# Patient Record
Sex: FEMALE | Race: WHITE | NOT HISPANIC OR LATINO | Employment: FULL TIME | URBAN - METROPOLITAN AREA
[De-identification: names, ages, dates, MRNs, and addresses within clinical notes are randomized per-mention and may not be internally consistent; named-entity substitution may affect disease eponyms.]

---

## 2017-03-08 ENCOUNTER — OFFICE VISIT (OUTPATIENT)
Dept: URGENT CARE | Facility: CLINIC | Age: 57
End: 2017-03-08
Payer: COMMERCIAL

## 2017-03-08 VITALS
RESPIRATION RATE: 20 BRPM | HEART RATE: 88 BPM | OXYGEN SATURATION: 96 % | SYSTOLIC BLOOD PRESSURE: 128 MMHG | DIASTOLIC BLOOD PRESSURE: 80 MMHG | TEMPERATURE: 98.8 F

## 2017-03-08 DIAGNOSIS — I10 ESSENTIAL HYPERTENSION: ICD-10-CM

## 2017-03-08 DIAGNOSIS — K57.52 DIVERTICULITIS OF BOTH SMALL AND LARGE INTESTINE WITHOUT PERFORATION OR ABSCESS WITHOUT BLEEDING: Primary | ICD-10-CM

## 2017-03-08 PROCEDURE — 99204 OFFICE O/P NEW MOD 45 MIN: CPT | Performed by: PHYSICIAN ASSISTANT

## 2017-03-08 RX ORDER — METRONIDAZOLE 500 MG/1
500 TABLET ORAL EVERY 8 HOURS
Qty: 30 TAB | Refills: 0 | Status: SHIPPED | OUTPATIENT
Start: 2017-03-08 | End: 2017-03-18

## 2017-03-08 RX ORDER — METOPROLOL TARTRATE AND HYDROCHLOROTHIAZIDE 50; 25 MG/1; MG/1
1 TABLET ORAL DAILY
COMMUNITY
End: 2017-03-08

## 2017-03-08 RX ORDER — CIPROFLOXACIN 500 MG/1
500 TABLET, FILM COATED ORAL 2 TIMES DAILY
Qty: 10 TAB | Refills: 0 | Status: SHIPPED | OUTPATIENT
Start: 2017-03-08 | End: 2017-03-13

## 2017-03-08 RX ORDER — METOPROLOL TARTRATE AND HYDROCHLOROTHIAZIDE 50; 25 MG/1; MG/1
1 TABLET ORAL 2 TIMES DAILY
Qty: 60 TAB | Refills: 3 | Status: SHIPPED | OUTPATIENT
Start: 2017-03-08 | End: 2017-04-07

## 2017-03-08 ASSESSMENT — CROHNS DISEASE ACTIVITY INDEX (CDAI): CDAI SCORE: 0

## 2017-03-08 ASSESSMENT — ENCOUNTER SYMPTOMS: ABDOMINAL PAIN: 1

## 2017-03-08 NOTE — PATIENT INSTRUCTIONS
Diverticulitis  Diverticulitis is inflammation or infection of small pouches in your colon that form when you have a condition called diverticulosis. The pouches in your colon are called diverticula. Your colon, or large intestine, is where water is absorbed and stool is formed.  Complications of diverticulitis can include:  · Bleeding.  · Severe infection.  · Severe pain.  · Perforation of your colon.  · Obstruction of your colon.  CAUSES   Diverticulitis is caused by bacteria.  Diverticulitis happens when stool becomes trapped in diverticula. This allows bacteria to grow in the diverticula, which can lead to inflammation and infection.  RISK FACTORS  People with diverticulosis are at risk for diverticulitis. Eating a diet that does not include enough fiber from fruits and vegetables may make diverticulitis more likely to develop.  SYMPTOMS   Symptoms of diverticulitis may include:  · Abdominal pain and tenderness. The pain is normally located on the left side of the abdomen, but may occur in other areas.  · Fever and chills.  · Bloating.  · Cramping.  · Nausea.  · Vomiting.  · Constipation.  · Diarrhea.  · Blood in your stool.  DIAGNOSIS   Your health care provider will ask you about your medical history and do a physical exam. You may need to have tests done because many medical conditions can cause the same symptoms as diverticulitis. Tests may include:  · Blood tests.  · Urine tests.  · Imaging tests of the abdomen, including X-rays and CT scans.  When your condition is under control, your health care provider may recommend that you have a colonoscopy. A colonoscopy can show how severe your diverticula are and whether something else is causing your symptoms.  TREATMENT   Most cases of diverticulitis are mild and can be treated at home. Treatment may include:  · Taking over-the-counter pain medicines.  · Following a clear liquid diet.  · Taking antibiotic medicines by mouth for 7-10 days.  More severe cases may  be treated at a hospital. Treatment may include:  · Not eating or drinking.  · Taking prescription pain medicine.  · Receiving antibiotic medicines through an IV tube.  · Receiving fluids and nutrition through an IV tube.  · Surgery.  HOME CARE INSTRUCTIONS   · Follow your health care provider's instructions carefully.  · Follow a full liquid diet or other diet as directed by your health care provider. After your symptoms improve, your health care provider may tell you to change your diet. He or she may recommend you eat a high-fiber diet. Fruits and vegetables are good sources of fiber. Fiber makes it easier to pass stool.  · Take fiber supplements or probiotics as directed by your health care provider.  · Only take medicines as directed by your health care provider.  · Keep all your follow-up appointments.  SEEK MEDICAL CARE IF:   · Your pain does not improve.  · You have a hard time eating food.  · Your bowel movements do not return to normal.  SEEK IMMEDIATE MEDICAL CARE IF:   · Your pain becomes worse.  · Your symptoms do not get better.  · Your symptoms suddenly get worse.  · You have a fever.  · You have repeated vomiting.  · You have bloody or black, tarry stools.  MAKE SURE YOU:   · Understand these instructions.  · Will watch your condition.  · Will get help right away if you are not doing well or get worse.     This information is not intended to replace advice given to you by your health care provider. Make sure you discuss any questions you have with your health care provider.     Document Released: 09/27/2006 Document Revised: 12/23/2014 Document Reviewed: 11/12/2014  [employer] Interactive Patient Education ©2016 [employer]

## 2017-03-08 NOTE — Clinical Note
March 8, 2017       Patient: Charlene Min   YOB: 1960   Date of Visit: 3/8/2017         To Whom It May Concern:    It is my medical opinion that Charlene Min may be excused from work for the date of 3/7/17 due to an acute on chronic flare up of diverticulitis.      If you have any questions or concerns, please don't hesitate to call [phone]          Sincerely,          Ousmane Garcia PA-C  Electronically Signed

## 2017-03-08 NOTE — MR AVS SNAPSHOT
Charlene Min   3/8/2017 8:30 AM   Office Visit   MRN: [id number]    Department:  [facility]   Dept Phone:  [phone]    Description:  Female : 1960   Provider:  Ousmane Garcia PA-C           Reason for Visit     Abdominal Pain Couple days LLQ pain.      Allergies as of 3/8/2017     Allergen Noted Reactions    Pcn [Penicillins] 12/15/2016         You were diagnosed with     Diverticulitis of both small and large intestine without perforation or abscess without bleeding   [[id number]]  -  Primary     Essential hypertension   [[id number]]         Vital Signs     Blood Pressure Pulse Temperature Respirations Oxygen Saturation Smoking Status    128/80 mmHg 88 37.1 °C (98.8 °F) 20 96% Current Some Day Smoker      Basic Information     Date Of Birth Sex Race Ethnicity Preferred Language    1960 Female White Non- English      Health Maintenance        Date Due Completion Dates    IMM DTaP/Tdap/Td Vaccine (1 - Tdap) 1981 ---    PAP SMEAR 1983 ---    MAMMOGRAM 2002 ---    COLONOSCOPY 2012 ---            Current Immunizations     Influenza Vaccine Quad Inj (Pf) 2016      Below and/or attached are the medications your provider expects you to take. Review all of your home medications and newly ordered medications with your provider and/or pharmacist. Follow medication instructions as directed by your provider and/or pharmacist. Please keep your medication list with you and share with your provider. Update the information when medications are discontinued, doses are changed, or new medications (including over-the-counter products) are added; and carry medication information at all times in the event of emergency situations     Allergies:  PCN - (reactions not documented)               Medications  Valid as of: 2017 -  9:06 AM    Generic Name Brand Name Tablet Size Instructions for use    Ciprofloxacin HCl (Tab) CIPRO 500 MG Take 1 Tab by mouth 2 times a day for 5  days.        Hydrocodone-Acetaminophen (Tab) NORCO 5-325 MG Take 1-2 Tabs by mouth every 6 hours as needed.        Metoprolol-Hydrochlorothiazide (Tab) LOPRESSOR HCT 50-25 MG Take 1 Tab by mouth 2 times a day for 30 days.        MetroNIDAZOLE (Tab) FLAGYL 500 MG Take 1 Tab by mouth every 8 hours for 10 days.        .                 Medicines prescribed today were sent to:     [id number] - [place], [place] - [address]    [address] [place] [address]    Phone: [phone] Fax: [phone]    Open 24 Hours?: No      Medication refill instructions:       If your prescription bottle indicates you have medication refills left, it is not necessary to call your provider’s office. Please contact your pharmacy and they will refill your medication.    If your prescription bottle indicates you do not have any refills left, you may request refills at any time through one of the following ways: The online [employer] system (except Urgent Care), by calling your provider’s office, or by asking your pharmacy to contact your provider’s office with a refill request. Medication refills are processed only during regular business hours and may not be available until the next business day. Your provider may request additional information or to have a follow-up visit with you prior to refilling your medication.   *Please Note: Medication refills are assigned a new Rx number when refilled electronically. Your pharmacy may indicate that no refills were authorized even though a new prescription for the same medication is available at the pharmacy. Please request the medicine by name with the pharmacy before contacting your provider for a refill.        Instructions    Diverticulitis  Diverticulitis is inflammation or infection of small pouches in your colon that form when you have a condition called diverticulosis. The pouches in your colon are called diverticula. Your colon, or large intestine, is where water is absorbed and  stool is formed.  Complications of diverticulitis can include:  · Bleeding.  · Severe infection.  · Severe pain.  · Perforation of your colon.  · Obstruction of your colon.  CAUSES   Diverticulitis is caused by bacteria.  Diverticulitis happens when stool becomes trapped in diverticula. This allows bacteria to grow in the diverticula, which can lead to inflammation and infection.  RISK FACTORS  People with diverticulosis are at risk for diverticulitis. Eating a diet that does not include enough fiber from fruits and vegetables may make diverticulitis more likely to develop.  SYMPTOMS   Symptoms of diverticulitis may include:  · Abdominal pain and tenderness. The pain is normally located on the left side of the abdomen, but may occur in other areas.  · Fever and chills.  · Bloating.  · Cramping.  · Nausea.  · Vomiting.  · Constipation.  · Diarrhea.  · Blood in your stool.  DIAGNOSIS   Your health care provider will ask you about your medical history and do a physical exam. You may need to have tests done because many medical conditions can cause the same symptoms as diverticulitis. Tests may include:  · Blood tests.  · Urine tests.  · Imaging tests of the abdomen, including X-rays and CT scans.  When your condition is under control, your health care provider may recommend that you have a colonoscopy. A colonoscopy can show how severe your diverticula are and whether something else is causing your symptoms.  TREATMENT   Most cases of diverticulitis are mild and can be treated at home. Treatment may include:  · Taking over-the-counter pain medicines.  · Following a clear liquid diet.  · Taking antibiotic medicines by mouth for 7-10 days.  More severe cases may be treated at a hospital. Treatment may include:  · Not eating or drinking.  · Taking prescription pain medicine.  · Receiving antibiotic medicines through an IV tube.  · Receiving fluids and nutrition through an IV tube.  · Surgery.  HOME CARE INSTRUCTIONS    · Follow your health care provider's instructions carefully.  · Follow a full liquid diet or other diet as directed by your health care provider. After your symptoms improve, your health care provider may tell you to change your diet. He or she may recommend you eat a high-fiber diet. Fruits and vegetables are good sources of fiber. Fiber makes it easier to pass stool.  · Take fiber supplements or probiotics as directed by your health care provider.  · Only take medicines as directed by your health care provider.  · Keep all your follow-up appointments.  SEEK MEDICAL CARE IF:   · Your pain does not improve.  · You have a hard time eating food.  · Your bowel movements do not return to normal.  SEEK IMMEDIATE MEDICAL CARE IF:   · Your pain becomes worse.  · Your symptoms do not get better.  · Your symptoms suddenly get worse.  · You have a fever.  · You have repeated vomiting.  · You have bloody or black, tarry stools.  MAKE SURE YOU:   · Understand these instructions.  · Will watch your condition.  · Will get help right away if you are not doing well or get worse.     This information is not intended to replace advice given to you by your health care provider. Make sure you discuss any questions you have with your health care provider.     Document Released: 09/27/2006 Document Revised: 12/23/2014 Document Reviewed: 11/12/2014  [employer] Interactive Patient Education ©2016 [employer].            5 Screens [employer] Access Code: Activation code not generated  Current TurnTide Status: Active          Quit Tobacco Information     Do you want to quit using tobacco?    Quitting tobacco decreases risks of cancer, heart and lung disease, increases life expectancy, improves sense of taste and smell, and increases spending money, among other benefits.    If you are thinking about quitting, we can help.  • [facility] Quit Tobacco Program: 373-444-1883  o Program occurs weekly for four weeks and includes pharmacist consultation on products  to support quitting smoking or chewing tobacco. A provider referral is needed for pharmacist consultation.  • Tobacco Users Help Hotline: 5-759-QUIT-NOW (327-5123) or https://[place].quitlogix.org/  o Free, confidential telephone and online coaching for [place] residents. Sessions are designed on a schedule that is convenient for you. Eligible clients receive free nicotine replacement therapy.  • Nationally: www.smokefree.gov  o Information and professional assistance to support both immediate and long-term needs as you become, and remain, a non-smoker. Smokefree.gov allows you to choose the help that best fits your needs.

## 2017-03-08 NOTE — PROGRESS NOTES
Subjective:      Pt is a 56 y.o. female who presents with Abdominal Pain            Abdominal Pain  This is a new problem. The current episode started yesterday. The onset quality is sudden. The problem occurs constantly. The problem has been gradually worsening. The pain is located in the LLQ. The pain is at a severity of 6/10. The pain is moderate. The quality of the pain is aching. The abdominal pain does not radiate. The pain is aggravated by eating and palpation. The pain is relieved by nothing. She has tried nothing for the symptoms. There is no history of abdominal surgery, colon cancer, Crohn's disease, gallstones, GERD, irritable bowel syndrome, pancreatitis or PUD. diverticulitis   Pt notes she was diagnosed with diverticulitis on 12/12/16. She states she has been eating without restrictions lately and notes that maybe the sunflower seeds in the salads at work might have triggered this current episode for the last 2 days with LLQ abd pain which she notes is in the same vein as the previous flare up. Pt has not taken any Rx medications for this condition. Pt states the pain is a 7/10, aching in nature and worse at night. Pt denies CP, SOB, NVD, paresthesias, headaches, dizziness, change in vision, hives, or other joint pain. The pt's medication list, problem list, and allergies have been evaluated and reviewed during today's visit. PT notes she has HTN and is running out of her Lopressor.    PMH:  Negative per pt.      PSH:  Negative per pt.      Fam Hx:  Father with hx of HTN      Soc HX:  Social History     Social History   • Marital Status: Single     Spouse Name: N/A   • Number of Children: N/A   • Years of Education: N/A     Occupational History   • Not on file.     Social History Main Topics   • Smoking status: Current Some Day Smoker     Types: Cigarettes   • Smokeless tobacco: Not on file   • Alcohol Use: Not on file   • Drug Use: Not on file   • Sexual Activity: Not on file     Other Topics Concern    • Not on file     Social History Narrative   • No narrative on file         Medications:    Current outpatient prescriptions:   •  ciprofloxacin (CIPRO) 500 MG Tab, Take 1 Tab by mouth 2 times a day for 5 days., Disp: 10 Tab, Rfl: 0  •  metronidazole (FLAGYL) 500 MG Tab, Take 1 Tab by mouth every 8 hours for 10 days., Disp: 30 Tab, Rfl: 0  •  metoprolol-hydrochlorothiazide (LOPRESSOR HCT) 50-25 MG per tablet, Take 1 Tab by mouth 2 times a day for 30 days., Disp: 60 Tab, Rfl: 3  •  hydrocodone-acetaminophen (NORCO) 5-325 MG Tab per tablet, Take 1-2 Tabs by mouth every 6 hours as needed., Disp: 20 Tab, Rfl: 0      Allergies:  Pcn        Review of Systems   Gastrointestinal: Positive for abdominal pain.   Constitutional: Negative for fever, chills and malaise/fatigue.   HENT: Negative for congestion and sore throat.    Eyes: Negative for blurred vision, double vision and photophobia.   Respiratory: Negative for cough and shortness of breath.    Cardiovascular: Negative for chest pain and palpitations.   Gastrointestinal: POS  LLQ abdominal pain   Genitourinary: Negative for dysuria and flank pain.   Musculoskeletal: Negative for joint pain and myalgias.   Skin: Negative for itching and rash.   Neurological: Negative for dizziness, tingling and headaches.   Endo/Heme/Allergies: Does not bruise/bleed easily.   Psychiatric/Behavioral: Negative for depression. The patient is not nervous/anxious.             Objective:     /80 mmHg  Pulse 88  Temp(Src) 37.1 °C (98.8 °F)  Resp 20  SpO2 96%     Physical Exam   Abdominal: Soft. Bowel sounds are normal. She exhibits no shifting dullness, no distension, no pulsatile liver, no fluid wave, no abdominal bruit, no ascites, no pulsatile midline mass and no mass. There is no hepatosplenomegaly. There is tenderness in the left lower quadrant. There is no rigidity, no rebound, no guarding, no CVA tenderness, no tenderness at McBurney's point and negative Paul's sign. No  hernia.              Constitutional: PT is oriented to person, place, and time. PT appears well-developed and well-nourished. No distress.   HENT:   Head: Normocephalic and atraumatic.   Mouth/Throat: Oropharynx is clear and moist. No oropharyngeal exudate.   Eyes: Conjunctivae normal and EOM are normal. Pupils are equal, round, and reactive to light.   Neck: Normal range of motion. Neck supple. No thyromegaly present.   Cardiovascular: Normal rate, regular rhythm, normal heart sounds and intact distal pulses.  Exam reveals no gallop and no friction rub.    No murmur heard.  Pulmonary/Chest: Effort normal and breath sounds normal. No respiratory distress. PT has no wheezes. PT has no rales. Pt exhibits no tenderness.   Musculoskeletal: Normal range of motion. PT exhibits no edema and no tenderness.   Neurological: PT is alert and oriented to person, place, and time. PT has normal reflexes. No cranial nerve deficit.   Skin: Skin is warm and dry. No rash noted. PT is not diaphoretic. No erythema.       Psychiatric: PT has a normal mood and affect. PT behavior is normal. Judgment and thought content normal.        Assessment/Plan:     1. Diverticulitis of both small and large intestine without perforation or abscess without bleeding    - ciprofloxacin (CIPRO) 500 MG Tab; Take 1 Tab by mouth 2 times a day for 5 days.  Dispense: 10 Tab; Refill: 0  - metronidazole (FLAGYL) 500 MG Tab; Take 1 Tab by mouth every 8 hours for 10 days.  Dispense: 30 Tab; Refill: 0    2. Essential hypertension    - metoprolol-hydrochlorothiazide (LOPRESSOR HCT) 50-25 MG per tablet; Take 1 Tab by mouth 2 times a day for 30 days.  Dispense: 60 Tab; Refill: 3    Rest, fluids encouraged.  Note given for work.  AVS with medical info given.  Pt was in full understanding and agreement with the plan.  Follow-up as needed if symptoms worsen or fail to improve.

## 2017-03-23 ENCOUNTER — OFFICE VISIT (OUTPATIENT)
Dept: MEDICAL GROUP | Facility: MEDICAL CENTER | Age: 57
End: 2017-03-23
Payer: COMMERCIAL

## 2017-03-23 VITALS
WEIGHT: 231 LBS | DIASTOLIC BLOOD PRESSURE: 64 MMHG | SYSTOLIC BLOOD PRESSURE: 118 MMHG | TEMPERATURE: 97.8 F | RESPIRATION RATE: 16 BRPM | HEIGHT: 65 IN | OXYGEN SATURATION: 95 % | BODY MASS INDEX: 38.49 KG/M2 | HEART RATE: 74 BPM

## 2017-03-23 DIAGNOSIS — Z12.31 ENCOUNTER FOR SCREENING MAMMOGRAM FOR BREAST CANCER: ICD-10-CM

## 2017-03-23 DIAGNOSIS — Z72.0 TOBACCO USE: ICD-10-CM

## 2017-03-23 DIAGNOSIS — Z82.49 FAMILY HISTORY OF EARLY CAD: ICD-10-CM

## 2017-03-23 DIAGNOSIS — E66.9 OBESITY (BMI 30-39.9): ICD-10-CM

## 2017-03-23 DIAGNOSIS — I10 ESSENTIAL HYPERTENSION: ICD-10-CM

## 2017-03-23 DIAGNOSIS — Z12.11 ENCOUNTER FOR FIT (FECAL IMMUNOCHEMICAL TEST) SCREENING: ICD-10-CM

## 2017-03-23 DIAGNOSIS — K57.32 DIVERTICULITIS OF LARGE INTESTINE WITHOUT PERFORATION OR ABSCESS WITHOUT BLEEDING: ICD-10-CM

## 2017-03-23 DIAGNOSIS — Z13.220 LIPID SCREENING: ICD-10-CM

## 2017-03-23 DIAGNOSIS — Z80.0 FAMILY HISTORY OF RECTAL CANCER: ICD-10-CM

## 2017-03-23 PROCEDURE — 99204 OFFICE O/P NEW MOD 45 MIN: CPT | Performed by: NURSE PRACTITIONER

## 2017-03-23 ASSESSMENT — PATIENT HEALTH QUESTIONNAIRE - PHQ9: CLINICAL INTERPRETATION OF PHQ2 SCORE: 0

## 2017-03-23 NOTE — ASSESSMENT & PLAN NOTE
Brother passed from rectal cancer in his 40s  She did have colonoscopy many years ago which was normal, is due for repeat at this time and declines. She is agreeable to completing fit test

## 2017-03-23 NOTE — ASSESSMENT & PLAN NOTE
Smoking on and off for about 30 years. Planning on quitting although she has not yet set a quit date

## 2017-03-23 NOTE — ASSESSMENT & PLAN NOTE
"Mother passed from MI at age 52  States her cholesterol has been \"normal\" in the past although has not been checked recently  "

## 2017-03-23 NOTE — ASSESSMENT & PLAN NOTE
2 episodes in the last 3 months, most recently 3/8/17. Missed 4 days of work the first time, 2 days of work the more recent episode  Now improved after abx therapy  Needs paperwork completed to avoid work penalty , has not been employed yet for a year and therefore does not qualify for FMLA leave  No current abdominal pain, bloating, nausea. She is having some loose stool but this seems to be gradually improving

## 2017-03-23 NOTE — ASSESSMENT & PLAN NOTE
Chronic issue managed with metoprolol-hctz 50-25 mg daily  Blood pressure controlled in the office today  No dizziness, palpitation, chest pain  No history of chronic kidney disease

## 2017-03-23 NOTE — PROGRESS NOTES
"Chief Complaint   Patient presents with   • Establish Care     Charlene Min is a 56 y.o. female here to [facility], she is not recently seen a primary care provider. We discussed:    Diverticulitis of large intestine without perforation or abscess without bleeding  2 episodes in the last 3 months, most recently 3/8/17. Missed 4 days of work the first time, 2 days of work the more recent episode  Now improved after abx therapy  Needs paperwork completed to avoid work penalty , has not been employed yet for a year and therefore does not qualify for FMLA leave  No current abdominal pain, bloating, nausea. She is having some loose stool but this seems to be gradually improving    Essential hypertension  Chronic issue managed with metoprolol-hctz 50-25 mg daily  Blood pressure controlled in the office today  No dizziness, palpitation, chest pain  No history of chronic kidney disease    Family history of early CAD  Mother passed from MI at age 52  States her cholesterol has been \"normal\" in the past although has not been checked recently    Tobacco use  Smoking on and off for about 30 years. Planning on quitting although she has not yet set a quit date    Family history of rectal cancer  Brother passed from rectal cancer in his 40s  She did have colonoscopy many years ago which was normal, is due for repeat at this time and declines. She is agreeable to completing fit test    BMI 38  Not exercising, admits her diet needs improvement. States that she's not been taking care of herself recently    Due for mammogram    Current medicines (including changes today)  Current Outpatient Prescriptions   Medication Sig Dispense Refill   • metoprolol-hydrochlorothiazide (LOPRESSOR HCT) 50-25 MG per tablet Take 1 Tab by mouth 2 times a day for 30 days. 60 Tab 3   • hydrocodone-acetaminophen (NORCO) 5-325 MG Tab per tablet Take 1-2 Tabs by mouth every 6 hours as needed. 20 Tab 0     No current facility-administered medications for " "this visit.     She  has a past medical history of Hypertension.  She  has past surgical history that includes abdominal hysterectomy total.  Social History   Substance Use Topics   • Smoking status: Current Some Day Smoker     Types: Cigarettes   • Smokeless tobacco: Never Used   • Alcohol Use: No     Social History     Social History Narrative     Family History   Problem Relation Age of Onset   • Heart Disease Mother    • Hypertension Mother    • Lung Disease Father    • Cancer Brother      colorectal     Family Status   Relation Status Death Age   • Mother     • Father     • Brother Alive    • Brother Alive    • Brother Alive    • Brother           ROS  Problems listed discussed above, all other systems reviewed and negative     Objective:     Blood pressure 118/64, pulse 74, temperature 36.6 °C (97.8 °F), resp. rate 16, height 1.651 m (5' 5\"), weight 104.781 kg (231 lb), SpO2 95 %. Body mass index is 38.44 kg/(m^2).  Physical Exam:  General: Alert, oriented in no acute distress.  Eye contact is good, speech is normal, affect calm  HEENT: perrl, Oral mucosa pink moist, no lesions. Nares patent. TMs gray with good landmarks bilaterally. No cervical or supraclavicular lymphadenopathy, no thyromegaly  Lungs: clear to auscultation bilaterally, good aeration, normal effort. No wheeze/ rhonchi/ rales.  CV: regular rate and rhythm, S1, S2. No murmur, no JVD, no edema. Pedal pulses 2 + bilaterally  Abdomen: soft, nontender, BS x4, central obesity  Ext: color normal, vascularity normal, temperature normal. No rash or lesions.  MS: no point tenderness over spine, no obvious deformity. No joint swelling or redness. Strength is 5/5 globally  Neuro: DTR 2+ bilaterally   Assessment and Plan:   The following treatment plan was discussed   1. Diverticulitis of large intestine without perforation or abscess without bleeding   2 flares in the last 3 months requiring workup since. She is not yet been " employed for one year and now needing reasonable accommodation forms completed. I will complete paperwork, she may pick it up at the  tomorrow    2. Essential hypertension   stable  COMP METABOLIC PANEL   3. Tobacco use   cessation encouraged, she does plan to quit but is not yet set a quit date. Options for smoking cessation assistance reviewed briefly including nicotine replacement and medications like Chantix. She'll follow-up if needing assistance with this    4. Encounter for FIT (fecal immunochemical test) screening  OCCULT BLOOD FECES IMMUNOASSAY   5. Encounter for screening mammogram for breast cancer  MA-SCREEN MAMMO W/CAD-BILAT   6. Lipid screening  LIPID PROFILE   7. Family history of early CAD   encourage smoking cessation, exercise and weight loss   8. Family history of rectal cancer   Brother passed from rectal cancer in his 40s. She has had colonoscopy in the past which was reportedly normal, declines referral at this time although she would be due for routine screening. Encouraged her to reconsider, notify me if needing referral. Fit test as listed above    9. Obesity (BMI 30-39.9)  Patient identified as having weight management issue.  Appropriate orders and counseling given.       Educated in proper administration of medication(s) ordered today including safety, possible SE, risks, benefits, rationale and alternatives to therapy.   Followup: pending labs             Please note that this dictation was created using voice recognition software. I have worked with consultants from the vendor as well as technical experts from [employer] to optimize the interface. I have made every reasonable attempt to correct obvious errors, but I expect that there are errors of grammar and possibly content that I did not discover before finalizing the note.

## 2017-03-23 NOTE — MR AVS SNAPSHOT
"        Charlene Min   3/23/2017 10:00 AM   Office Visit   MRN: [id number]    Department:  [facility]   Dept Phone:  [phone]    Description:  Female : 1960   Provider:  MARIA T Virgen           Reason for Visit     Establish Care           Allergies as of 3/23/2017     Allergen Noted Reactions    Pcn [Penicillins] 12/15/2016         You were diagnosed with     Diverticulitis of large intestine without perforation or abscess without bleeding   [785408]       Essential hypertension   [7169967]       Tobacco use   [725406]       Encounter for FIT (fecal immunochemical test) screening   [1624724]       Encounter for screening mammogram for breast cancer   [9824700]       Lipid screening   [770648]       Family history of early CAD   [703724]         Vital Signs     Blood Pressure Pulse Temperature Respirations Height Weight    118/64 mmHg 74 36.6 °C (97.8 °F) 16 1.651 m (5' 5\") 104.781 kg (231 lb)    Body Mass Index Oxygen Saturation Smoking Status             38.44 kg/m2 95% Current Some Day Smoker         Basic Information     Date Of Birth Sex Race Ethnicity Preferred Language    1960 Female White Non- English      Problem List              ICD-10-CM Priority Class Noted - Resolved    Diverticulitis of large intestine without perforation or abscess without bleeding K57.32   3/23/2017 - Present    Essential hypertension I10   3/23/2017 - Present    Tobacco use Z72.0   3/23/2017 - Present    Family history of early CAD Z82.49   3/23/2017 - Present      Health Maintenance        Date Due Completion Dates    IMM DTaP/Tdap/Td Vaccine (1 - Tdap) 1981 ---    PAP SMEAR 1983 ---    MAMMOGRAM 2002 ---    COLONOSCOPY 2012 ---            Current Immunizations     Influenza Vaccine Quad Inj (Pf) 2016      Below and/or attached are the medications your provider expects you to take. Review all of your home medications and newly ordered medications with your " provider and/or pharmacist. Follow medication instructions as directed by your provider and/or pharmacist. Please keep your medication list with you and share with your provider. Update the information when medications are discontinued, doses are changed, or new medications (including over-the-counter products) are added; and carry medication information at all times in the event of emergency situations     Allergies:  PCN - (reactions not documented)               Medications  Valid as of: March 23, 2017 - 10:23 AM    Generic Name Brand Name Tablet Size Instructions for use    Hydrocodone-Acetaminophen (Tab) NORCO 5-325 MG Take 1-2 Tabs by mouth every 6 hours as needed.        Metoprolol-Hydrochlorothiazide (Tab) LOPRESSOR HCT 50-25 MG Take 1 Tab by mouth 2 times a day for 30 days.        .                 Medicines prescribed today were sent to:     [facility][id number] - [place], [place] - [address]    [address] [address]    Phone: [phone] Fax: [phone]    Open 24 Hours?: No      Medication refill instructions:       If your prescription bottle indicates you have medication refills left, it is not necessary to call your provider’s office. Please contact your pharmacy and they will refill your medication.    If your prescription bottle indicates you do not have any refills left, you may request refills at any time through one of the following ways: The online [employer] system (except Urgent Care), by calling your provider’s office, or by asking your pharmacy to contact your provider’s office with a refill request. Medication refills are processed only during regular business hours and may not be available until the next business day. Your provider may request additional information or to have a follow-up visit with you prior to refilling your medication.   *Please Note: Medication refills are assigned a new Rx number when refilled electronically. Your pharmacy may indicate that no refills were  authorized even though a new prescription for the same medication is available at the pharmacy. Please request the medicine by name with the pharmacy before contacting your provider for a refill.        Your To Do List     Future Labs/Procedures Complete By Expires    COMP METABOLIC PANEL  As directed 3/24/2018    LIPID PROFILE  As directed 3/24/2018    MA-SCREEN MAMMO W/CAD-BILAT  As directed 4/24/2018    OCCULT BLOOD FECES IMMUNOASSAY  As directed 3/24/2018         MyChart Access Code: Activation code not generated  Current MyChart Status: Active          Quit Tobacco Information     Do you want to quit using tobacco?    Quitting tobacco decreases risks of cancer, heart and lung disease, increases life expectancy, improves sense of taste and smell, and increases spending money, among other benefits.    If you are thinking about quitting, we can help.  • [employer] Quit Tobacco Program: 802.619.6671  o Program occurs weekly for four weeks and includes pharmacist consultation on products to support quitting smoking or chewing tobacco. A provider referral is needed for pharmacist consultation.  • Tobacco Users Help Hotline: 3-068-QUITNOW (925-3790) or https://[place].quitlogix.org/  o Free, confidential telephone and online coaching for [place] residents. Sessions are designed on a schedule that is convenient for you. Eligible clients receive free nicotine replacement therapy.  • Nationally: www.smokefree.gov  o Information and professional assistance to support both immediate and long-term needs as you become, and remain, a non-smoker. Smokefree.gov allows you to choose the help that best fits your needs.

## 2017-06-24 ENCOUNTER — HOSPITAL ENCOUNTER (EMERGENCY)
Facility: MEDICAL CENTER | Age: 57
End: 2017-06-24
Attending: EMERGENCY MEDICINE
Payer: COMMERCIAL

## 2017-06-24 VITALS
HEART RATE: 78 BPM | TEMPERATURE: 98.6 F | DIASTOLIC BLOOD PRESSURE: 76 MMHG | HEIGHT: 65 IN | RESPIRATION RATE: 18 BRPM | WEIGHT: 233.69 LBS | BODY MASS INDEX: 38.93 KG/M2 | SYSTOLIC BLOOD PRESSURE: 131 MMHG | OXYGEN SATURATION: 96 %

## 2017-06-24 DIAGNOSIS — R10.32 LLQ ABDOMINAL PAIN: ICD-10-CM

## 2017-06-24 PROCEDURE — 700102 HCHG RX REV CODE 250 W/ 637 OVERRIDE(OP): Performed by: EMERGENCY MEDICINE

## 2017-06-24 PROCEDURE — 99284 EMERGENCY DEPT VISIT MOD MDM: CPT

## 2017-06-24 PROCEDURE — A9270 NON-COVERED ITEM OR SERVICE: HCPCS | Performed by: EMERGENCY MEDICINE

## 2017-06-24 RX ORDER — CIPROFLOXACIN 500 MG/1
500 TABLET, FILM COATED ORAL 2 TIMES DAILY
Qty: 14 TAB | Refills: 0 | Status: SHIPPED | OUTPATIENT
Start: 2017-06-24 | End: 2017-12-13 | Stop reason: SDUPTHER

## 2017-06-24 RX ORDER — CIPROFLOXACIN 500 MG/1
500 TABLET, FILM COATED ORAL ONCE
Status: COMPLETED | OUTPATIENT
Start: 2017-06-24 | End: 2017-06-24

## 2017-06-24 RX ORDER — METRONIDAZOLE 500 MG/1
500 TABLET ORAL ONCE
Status: COMPLETED | OUTPATIENT
Start: 2017-06-24 | End: 2017-06-24

## 2017-06-24 RX ORDER — HYDROCHLOROTHIAZIDE 25 MG/1
12.5 TABLET ORAL DAILY
Status: SHIPPED | COMMUNITY
End: 2017-07-06

## 2017-06-24 RX ORDER — METRONIDAZOLE 500 MG/1
500 TABLET ORAL 3 TIMES DAILY
Qty: 21 TAB | Refills: 0 | Status: SHIPPED | OUTPATIENT
Start: 2017-06-24 | End: 2017-12-13 | Stop reason: SDUPTHER

## 2017-06-24 RX ADMIN — CIPROFLOXACIN HYDROCHLORIDE 500 MG: 500 TABLET, FILM COATED ORAL at 18:11

## 2017-06-24 RX ADMIN — METRONIDAZOLE 500 MG: 500 TABLET ORAL at 18:11

## 2017-06-24 ASSESSMENT — PAIN SCALES - GENERAL: PAINLEVEL_OUTOF10: 4

## 2017-06-24 NOTE — ED AVS SNAPSHOT
Home Care Instructions                                                                                                                Charlene Min   MRN: [id number]    Department:  [facility], Emergency Dept   Date of Visit:  6/24/2017            [facility], Emergency Dept    [address] [facility]    [place] [address]    Phone:  [phone]      You were seen by     Stephanie Dey M.D.      Your Diagnosis Was     LLQ abdominal pain     R10.32       These are the medications you received during your hospitalization from 06/24/2017 1658 to 06/24/2017 1814     Date/Time Order Dose Route Action    06/24/2017 1811 ciprofloxacin (CIPRO) tablet 500 mg 500 mg Oral Given    06/24/2017 1811 metronidazole (FLAGYL) tablet 500 mg 500 mg Oral Given      Follow-up Information     1. Follow up with Lara Hoffman M.D..    Specialty:  Family Medicine    Why:  Monday for recheck this week    Contact information    [address] [facility]  [address]  [place] [address] [phone]          2. Follow up with [facility], Emergency Dept.    Specialty:  Emergency Medicine    Why:  If symptoms worsen    Contact information    [address]  [place] [address] [phone]      Medication Information     Review all of your home medications and newly ordered medications with your primary doctor and/or pharmacist as soon as possible. Follow medication instructions as directed by your doctor and/or pharmacist.     Please keep your complete medication list with you and share with your physician. Update the information when medications are discontinued, doses are changed, or new medications (including over-the-counter products) are added; and carry medication information at all times in the event of emergency situations.               Medication List      START taking these medications        Instructions    Morning Afternoon Evening Bedtime    ciprofloxacin 500 MG  Tabs   Last time this was given:  500 mg on 6/24/2017  6:11 PM   Commonly known as:  CIPRO        Take 1 Tab by mouth 2 times a day for 7 days.   Dose:  500 mg                        metronidazole 500 MG Tabs   Last time this was given:  500 mg on 6/24/2017  6:11 PM   Commonly known as:  FLAGYL        Take 1 Tab by mouth 3 times a day for 7 days.   Dose:  500 mg                          ASK your doctor about these medications        Instructions    Morning Afternoon Evening Bedtime    hydrochlorothiazide 25 MG Tabs   Commonly known as:  HYDRODIURIL        Take 12.5 mg by mouth every day.   Dose:  12.5 mg                        hydrocodone-acetaminophen 5-325 MG Tabs per tablet   Commonly known as:  NORCO        Take 1-2 Tabs by mouth every 6 hours as needed.   Dose:  1-2 Tab                        metoprolol 25 MG Tabs   Commonly known as:  LOPRESSOR        Take 25 mg by mouth 2 times a day.   Dose:  25 mg                             Where to Get Your Medications      These medications were sent to [facility]Y #[id number] - [place] - [address]  [address] [address]     Phone:  [phone]    - ciprofloxacin 500 MG Tabs      You can get these medications from any pharmacy     Bring a paper prescription for each of these medications    - metronidazole 500 MG Tabs              Discharge Instructions       Diverticulitis  Diverticulitis is when small pockets that have formed in your colon (large intestine) become infected or swollen.  HOME CARE  · Follow your doctor's instructions.  · Follow a special diet if told by your doctor.  · When you feel better, your doctor may tell you to change your diet. You may be told to eat a lot of fiber. Fruits and vegetables are good sources of fiber. Fiber makes it easier to poop (have bowel movements).  · Take supplements or probiotics as told by your doctor.  · Only take medicines as told by your doctor.  · Keep all follow-up visits with your doctor.  GET HELP  IF:  · Your pain does not get better.  · You have a hard time eating food.  · You are not pooping like normal.  GET HELP RIGHT AWAY IF:  · Your pain gets worse.  · Your problems do not get better.  · Your problems suddenly get worse.  · You have a fever.  · You keep throwing up (vomiting).  · You have bloody or black, tarry poop (stool).  MAKE SURE YOU:   · Understand these instructions.  · Will watch your condition.  · Will get help right away if you are not doing well or get worse.     This information is not intended to replace advice given to you by your health care provider. Make sure you discuss any questions you have with your health care provider.     Document Released: 06/05/2009 Document Revised: 12/23/2014 Document Reviewed: 11/12/2014  [employer] Interactive Patient Education ©2016 [employer]            Patient Information     Patient Information    Following emergency treatment: all patient requiring follow-up care must return either to a private physician or a clinic if your condition worsens before you are able to obtain further medical attention, please return to the emergency room.     Billing Information    At [facility], we work to make the billing process streamlined for our patients.  Our Representatives are here to answer any questions you may have regarding your hospital bill.  If you have insurance coverage and have supplied your insurance information to us, we will submit a claim to your insurer on your behalf.  Should you have any questions regarding your bill, we can be reached online or by phone as follows:  Online: You are able pay your bills online or live chat with our representatives about any billing questions you may have. We are here to help Monday - Friday from 8:00am to 7:30pm and 9:00am - 12:00pm on Saturdays.  Please visit [email or web address]  for more information.   Phone:  [phone] or [phone]    Please note that your emergency  physician, surgeon, pathologist, radiologist, anesthesiologist, and other specialists are not employed by [facility] and will therefore bill separately for their services.  Please contact them directly for any questions concerning their bills at the numbers below:     Emergency Physician Services:  [phone]  [facility]:  [phone]  [facility]:  [phone]  [facility]:  [phone]    1. Your final bill may vary from the amount quoted upon discharge if all procedures are not complete at that time, or if your doctor has additional procedures of which we are not aware. You will receive an additional bill if you return to the Emergency Department at [facility] for suture removal regardless of the facility of which the sutures were placed.     2. Please arrange for settlement of this account at the emergency registration.    3. All self-pay accounts are due in full at the time of treatment.  If you are unable to meet this obligation then payment is expected within 4-5 days.     4. If you have had radiology studies (CT, X-ray, Ultrasound, MRI), you have received a preliminary result during your emergency department visit. Please contact the radiology department [phone] to receive a copy of your final result. Please discuss the Final result with your primary physician or with the follow up physician provided.     Crisis Hotline:  [place] Crisis Hotline:  3-934-WZJAALF or 1-674.252.3433  [place] Crisis Hotline:    1-307.695.2459 or 154-584-6425         ED Discharge Follow Up Questions    1. In order to provide you with very good care, we would like to follow up with a phone call in the next few days.  May we have your permission to contact you?     YES /  NO    2. What is the best phone number to call you? (       )_____-__________    3. What is the best time to call you?      Morning  /  Afternoon  /  Evening                   Patient Signature:   ____________________________________________________________    Date:  ____________________________________________________________      Your appointments     Jul 06, 2017  8:40 AM   Annual/Preventative with MARIA T Virgen   [facility])    [address]  [place] [address]   [phone]           You will be receiving a confirmation call a few days before your appointment from our automated call confirmation system.   Please bring to your appointment; a photo ID, copies of your insurance card, all medication bottles and any co-pay that you are responsible for.  Please allow 45-60 minutes to complete your scheduled appointment.

## 2017-06-24 NOTE — ED AVS SNAPSHOT
6/24/2017    Charlene Min  [address]  [place] [address]    Dear Charlene:    [facility] wants to ensure your discharge home is safe and you or your loved ones have had all of your questions answered regarding your care after you leave the hospital.    Below is a list of resources and contact information should you have any questions regarding your hospital stay, follow-up instructions, or active medical symptoms.    Questions or Concerns Regarding… Contact   Medical Questions Related to Your Discharge  (7 days a week, 8am-5pm) Contact a Nurse Care Coordinator   [phone]   Medical Questions Not Related to Your Discharge  (24 hours a day / 7 days a week)  Contact the Nurse Health Line   [phone]    Medications or Discharge Instructions Refer to your discharge packet   or contact your [facility] Primary Care Provider   [phone]   Follow-up Appointment(s) Schedule your appointment via [employer]   or contact Scheduling [phone]   Billing Review your statement via [employer]  or contact Billing [phone]   Medical Records Review your records via [employer]   or contact Medical Records [phone]     You may receive a telephone call within two days of discharge. This call is to make certain you understand your discharge instructions and have the opportunity to have any questions answered. You can also easily access your medical information, test results and upcoming appointments via the [employer] free online health management tool. You can learn more and sign up at [employer]/Heap. For assistance setting up your Heap account, please call [phone].    Once again, we want to ensure your discharge home is safe and that you have a clear understanding of any next steps in your care. If you have any questions or concerns, please do not hesitate to contact us, we are here for you. Thank you for choosing [facility] for your healthcare needs.    Sincerely,    Your [facility] Healthcare Team

## 2017-06-24 NOTE — ED AVS SNAPSHOT
Zingku Access Code: Activation code not generated  Current Zingku Status: Active    [employer]  A secure, online tool to manage your health information     [employer]’s Zingku® is a secure, online tool that connects you to your personalized health information from the privacy of your home -- day or night - making it very easy for you to manage your healthcare. Once the activation process is completed, you can even access your medical information using the Zingku buster, which is available for free in the Apple Buster store or Google Play store.     Zingku provides the following levels of access (as shown below):   My Chart Features   [facility] Primary Care Doctor [facility]  Specialists [facility]  Urgent  Care Non-[facility]  Primary Care  Doctor   Email your healthcare team securely and privately 24/7 X X X X   Manage appointments: schedule your next appointment; view details of past/upcoming appointments X      Request prescription refills. X      View recent personal medical records, including lab and immunizations X X X X   View health record, including health history, allergies, medications X X X X   Read reports about your outpatient visits, procedures, consult and ER notes X X X X   See your discharge summary, which is a recap of your hospital and/or ER visit that includes your diagnosis, lab results, and care plan. X X       How to register for Zingku:  1. Go to  [email or web address].  2. Click on the Sign Up Now box, which takes you to the New Member Sign Up page. You will need to provide the following information:  a. Enter your Zingku Access Code exactly as it appears at the top of this page. (You will not need to use this code after you’ve completed the sign-up process. If you do not sign up before the expiration date, you must request a new code.)   b. Enter your date of birth.   c. Enter your home email address.   d. Click Submit, and follow the next screen’s instructions.  3. Create a Zingku ID. This will  be your CallMiner login ID and cannot be changed, so think of one that is secure and easy to remember.  4. Create a CallMiner password. You can change your password at any time.  5. Enter your Password Reset Question and Answer. This can be used at a later time if you forget your password.   6. Enter your e-mail address. This allows you to receive e-mail notifications when new information is available in CallMiner.  7. Click Sign Up. You can now view your health information.    For assistance activating your CallMiner account, call [phone]

## 2017-06-25 NOTE — DISCHARGE INSTRUCTIONS
Diverticulitis  Diverticulitis is when small pockets that have formed in your colon (large intestine) become infected or swollen.  HOME CARE  · Follow your doctor's instructions.  · Follow a special diet if told by your doctor.  · When you feel better, your doctor may tell you to change your diet. You may be told to eat a lot of fiber. Fruits and vegetables are good sources of fiber. Fiber makes it easier to poop (have bowel movements).  · Take supplements or probiotics as told by your doctor.  · Only take medicines as told by your doctor.  · Keep all follow-up visits with your doctor.  GET HELP IF:  · Your pain does not get better.  · You have a hard time eating food.  · You are not pooping like normal.  GET HELP RIGHT AWAY IF:  · Your pain gets worse.  · Your problems do not get better.  · Your problems suddenly get worse.  · You have a fever.  · You keep throwing up (vomiting).  · You have bloody or black, tarry poop (stool).  MAKE SURE YOU:   · Understand these instructions.  · Will watch your condition.  · Will get help right away if you are not doing well or get worse.     This information is not intended to replace advice given to you by your health care provider. Make sure you discuss any questions you have with your health care provider.     Document Released: 06/05/2009 Document Revised: 12/23/2014 Document Reviewed: 11/12/2014  [employer] Patient Education ©2016 [employer]

## 2017-06-25 NOTE — ED PROVIDER NOTES
ED Provider Note    CHIEF COMPLAINT  Chief Complaint   Patient presents with   • Diverticulitis       HPI  Charlene Min is a 57 y.o. female who presents stating that she started having constant left lower quadrant aching and pain especially with pressure from pants since 1:30 this afternoon. This feels exactly like her last flareup of diverticulitis that she had approximately 6 months ago. She has imaging done at that time which chronicled the diverticulitis. She did want to wait until things got as bad as they did previously. She is refusing to put on a gown or have a workup at this time. She thinks is because she ate steak for her birthday a few days ago. She denies any changes in urination, fevers, vomiting. She is status post total abdominal hysterectomy but no other abdominal surgeries.    REVIEW OF SYSTEMS  See HPI for further details. All other systems are negative.    PAST MEDICAL HISTORY  Past Medical History   Diagnosis Date   • Hypertension        FAMILY HISTORY  Family History   Problem Relation Age of Onset   • Heart Disease Mother    • Hypertension Mother    • Lung Disease Father    • Cancer Brother      colorectal       SOCIAL HISTORY  Social History     Social History   • Marital Status: Single     Spouse Name: N/A   • Number of Children: N/A   • Years of Education: N/A     Social History Main Topics   • Smoking status: Current Some Day Smoker     Types: Cigarettes   • Smokeless tobacco: Never Used   • Alcohol Use: No   • Drug Use: No   • Sexual Activity: Not Asked     Other Topics Concern   • None     Social History Narrative       SURGICAL HISTORY  Past Surgical History   Procedure Laterality Date   • Abdominal hysterectomy total         CURRENT MEDICATIONS  Home Medications     Reviewed by Juan Frederick R.N. (Registered Nurse) on 06/24/17 at 1715  Med List Status: Complete    Medication Last Dose Status    hydrochlorothiazide (HYDRODIURIL) 25 MG Tab 6/24/2017 Active    hydrocodone-acetaminophen  "(NORCO) 5-325 MG Tab per tablet PRN Active    metoprolol (LOPRESSOR) 25 MG Tab 6/24/2017 Active                ALLERGIES  Allergies   Allergen Reactions   • Pcn [Penicillins]        PHYSICAL EXAM  VITAL SIGNS: /76 mmHg  Pulse 78  Temp(Src) 37 °C (98.6 °F)  Resp 18  Ht 1.651 m (5' 5\")  Wt 106 kg (233 lb 11 oz)  BMI 38.89 kg/m2  SpO2 96% @CARLOS[200176::@   Constitutional: Well developed, obese, No acute respiratory distress, Non-toxic appearance.   HENT: Normocephalic, Atraumatic, Bilateral external ears normal, Oropharynx clear, mucous membranes are moist.  Eyes: PERRLA, EOMI, Conjunctiva normal, No discharge. No icterus.  Neck: Normal range of motion. Supple, No stridor.   Lymphatic: No cervical lymphadenopathy noted.   Cardiovascular: Normal heart rate, Normal rhythm, No murmurs, No rubs, No gallops.   Thorax & Lungs: Clear to auscultation bilaterally, No respiratory distress, No wheezing.  Abdomen: Soft, nondistended, normoactive bowel sounds, tender to the left quadrant only with involuntary guarding. No rebound  Skin: Warm, Dry, No erythema, No rash.   Extremities: Intact distal pulses, No edema, No tenderness  Musculoskeletal: Good range of motion in all major joints. No tenderness to palpation or major deformities noted. Normal gait.  Neurologic: Alert & oriented x 3, cranial nerve and cerebellar exams grossly normal. No focal deficits noted.   Psychiatric: Affect normal, Judgment normal, Mood normal.         COURSE & MEDICAL DECISION MAKING  Pertinent Labs & Imaging studies reviewed. (See chart for details)  Patient was here on 12/15/16 with diverticulitis without abscess according to her old records.    Patient states she would like a dose of antibiotics now and does not want a workup. She states she will follow-up with her primary care provider this coming week return here if her symptoms are getting worse instead of better. She is given a dose of Cipro and Flagyl here in the emergency department " prior to discharge.     The patient will return for new or worsening symptoms and is stable at the time of discharge.    The patient is referred to a primary physician for blood pressure management, diabetic screening, and for all other preventative health concerns.    DISPOSITION:  Patient will be discharged home in stable condition.    FOLLOW UP:  Lara Hoffman M.D.  [address]  [address]  [facility] [id number] [phone]      Monday for recheck this week    [facility], Emergency Dept  [address]  [facility] [id number] [phone]    If symptoms worsen      OUTPATIENT MEDICATIONS:  New Prescriptions    CIPROFLOXACIN (CIPRO) 500 MG TAB    Take 1 Tab by mouth 2 times a day for 7 days.    METRONIDAZOLE (FLAGYL) 500 MG TAB    Take 1 Tab by mouth 3 times a day for 7 days.           FINAL IMPRESSION  1. LLQ abdominal pain               Electronically signed by: Stephanie Dey, 6/24/2017 6:09 PM

## 2017-07-06 ENCOUNTER — OFFICE VISIT (OUTPATIENT)
Dept: MEDICAL GROUP | Facility: MEDICAL CENTER | Age: 57
End: 2017-07-06
Payer: COMMERCIAL

## 2017-07-06 VITALS
TEMPERATURE: 97.4 F | BODY MASS INDEX: 38.82 KG/M2 | WEIGHT: 233 LBS | OXYGEN SATURATION: 97 % | HEART RATE: 84 BPM | DIASTOLIC BLOOD PRESSURE: 78 MMHG | HEIGHT: 65 IN | SYSTOLIC BLOOD PRESSURE: 118 MMHG

## 2017-07-06 DIAGNOSIS — Z12.11 ENCOUNTER FOR SCREENING COLONOSCOPY: ICD-10-CM

## 2017-07-06 DIAGNOSIS — Z72.0 TOBACCO USE: ICD-10-CM

## 2017-07-06 DIAGNOSIS — Z00.00 ANNUAL PHYSICAL EXAM: ICD-10-CM

## 2017-07-06 DIAGNOSIS — K57.32 DIVERTICULITIS OF LARGE INTESTINE WITHOUT PERFORATION OR ABSCESS WITHOUT BLEEDING: ICD-10-CM

## 2017-07-06 DIAGNOSIS — I10 ESSENTIAL HYPERTENSION: ICD-10-CM

## 2017-07-06 DIAGNOSIS — Z80.0 FAMILY HISTORY OF RECTAL CANCER: ICD-10-CM

## 2017-07-06 DIAGNOSIS — E66.9 OBESITY (BMI 30-39.9): ICD-10-CM

## 2017-07-06 DIAGNOSIS — Z82.49 FAMILY HISTORY OF EARLY CAD: ICD-10-CM

## 2017-07-06 PROCEDURE — 99396 PREV VISIT EST AGE 40-64: CPT | Performed by: NURSE PRACTITIONER

## 2017-07-06 RX ORDER — METOPROLOL TARTRATE AND HYDROCHLOROTHIAZIDE 50; 25 MG/1; MG/1
1 TABLET ORAL DAILY
Qty: 30 TAB | Refills: 5 | Status: SHIPPED | OUTPATIENT
Start: 2017-07-06 | End: 2018-06-29 | Stop reason: SDUPTHER

## 2017-07-06 NOTE — ASSESSMENT & PLAN NOTE
Mother passed away from MI at age 52   labs ordered at last appointment, patient has not completed

## 2017-07-06 NOTE — ASSESSMENT & PLAN NOTE
Brother passed away from rectal cancer in his 40s  She reportedly had colonoscopy many years ago which was normal, she's been overdue for follow-up needing referral

## 2017-07-06 NOTE — ASSESSMENT & PLAN NOTE
Managed with metoprolol-hctz 50-25 mg daily  BP well controlled  No dizziness, palpitation, chest pain  No CKD

## 2017-07-06 NOTE — ASSESSMENT & PLAN NOTE
Working on improving diet although she admits that she has been inconsistent recently  Not doing any regular exercise

## 2017-07-06 NOTE — ASSESSMENT & PLAN NOTE
Continues smoking 1/2-1 ppd  Now interested in quitting, not yet set a quit date   Has tried nicotine replacement but no prescription medications in the past  No history of depression, seizure  She is interested in Chantix

## 2017-07-06 NOTE — ASSESSMENT & PLAN NOTE
H/o several episodes but most recent last month  Now improved after abx therapy  Has not seen GI or had colonoscopy

## 2017-07-06 NOTE — PROGRESS NOTES
Chief Complaint   Patient presents with   • Annual Exam     no pap     Charlene Min is a 57 y.o. female here for annual exam. She's had complete hysterectomy and no longer requires Pap smear. She declines pelvic exam. We discussed:    Diverticulitis of large intestine without perforation or abscess without bleeding  H/o several episodes but most recent last month, feels that it was triggered by red meat  Now improved after abx therapy. Needing renewal of [place] paperwork as she has had 3 episodes this year  Has not seen GI or had colonoscopy  Tobacco use  Continues smoking 1/2-1 ppd  Now interested in quitting, not yet set a quit date   Has tried nicotine replacement but no prescription medications in the past  No history of depression, seizure  She is interested in Chantix  Essential hypertension  Managed with metoprolol-hctz 50-25 mg daily  BP well controlled  No dizziness, palpitation, chest pain  No CKD  Family history of early CAD  Mother passed away from MI at age 52   labs ordered at last appointment, patient has not completed  Family history of rectal cancer  Brother passed away from rectal cancer in his 40s  She reportedly had colonoscopy many years ago which was normal, she's been overdue for follow-up needing referral  Obesity (BMI 30-39.9)  Working on improving diet although she admits that she has been inconsistent recently  Not doing any regular exercise      Current medicines (including changes today)  Current Outpatient Prescriptions   Medication Sig Dispense Refill   • varenicline (CHANTIX GAIL) 0.5 MG X 11 & 1 MG X 42 tablet 0.5 mg PO daily x 3 days then 0.5 mg PO BID x 4 days then 1 daily until gone 56 Tab 3   • metoprolol-hydrochlorothiazide (LOPRESSOR HCT) 50-25 MG per tablet Take 1 Tab by mouth every day. 30 Tab 5   • metoprolol (LOPRESSOR) 25 MG Tab Take 25 mg by mouth 2 times a day.     • hydrochlorothiazide (HYDRODIURIL) 25 MG Tab Take 12.5 mg by mouth every day.     • hydrocodone-acetaminophen  "(NORCO) 5-325 MG Tab per tablet Take 1-2 Tabs by mouth every 6 hours as needed. 20 Tab 0     No current facility-administered medications for this visit.     She  has a past medical history of Hypertension.  She  has past surgical history that includes abdominal hysterectomy total.  Social History   Substance Use Topics   • Smoking status: Current Some Day Smoker     Types: Cigarettes   • Smokeless tobacco: Never Used   • Alcohol Use: No     Social History     Social History Narrative     Family History   Problem Relation Age of Onset   • Heart Disease Mother    • Hypertension Mother    • Lung Disease Father    • Cancer Brother      colorectal     Family Status   Relation Status Death Age   • Mother     • Father     • Brother Alive    • Brother Alive    • Brother Alive    • Brother           ROS  Problems listed discussed above, all other systems reviewed and negative     Objective:     Blood pressure 118/78, pulse 84, temperature 36.3 °C (97.4 °F), height 1.651 m (5' 5\"), weight 105.688 kg (233 lb), SpO2 97 %. Body mass index is 38.77 kg/(m^2).  Physical Exam:  General: Alert, oriented in no acute distress.  Eye contact is good, speech is normal, affect calm  HEENT: perrl, Oral mucosa pink moist, no lesions. Nares patent. TMs gray with good landmarks bilaterally. No cervical or supraclavicular lymphadenopathy  Lungs: clear to auscultation bilaterally, good aeration, normal effort. No wheeze/ rhonchi/ rales.  CV: regular rate and rhythm, S1, S2. No murmur, no JVD, no edema.  Pedal pulses 2 + bilaterally  Abdomen: soft, nontender, BS x4, central obesity  Ext: color normal, vascularity normal, temperature normal. No rash or lesions.  MS: no point tenderness over spine, no obvious deformity. No joint swelling or redness. Strength is 5/5 globally  Neuro: DTR 2+ bilaterally  Assessment and Plan:   The following treatment plan was discussed   1. Annual physical exam   general health and wellness " discussion including healthy diet, regular exercise, weight loss recommended. Preventative health screenings including mammogram and colonoscopy discussed. Mammogram previously ordered, phone number provided. Referral for colonoscopy placed. Labs as listed below. 2000 international units vitamin D3 twice daily  COMP METABOLIC PANEL    LIPID PROFILE   2. Diverticulitis of large intestine without perforation or abscess without bleeding   3 recent acute episodes requiring hospital visit, most recent in June and now resolved  REFERRAL TO GASTROENTEROLOGY   3. Tobacco use   ready to quit, not yet set a quit date. Interested in Chantix. Risks benefits and side effects reviewed. No history of depression or seizures. Discussed setting quit date 10 days after starting medication. Follow-up for any change in mood or other concern  varenicline (CHANTIX GAIL) 0.5 MG X 11 & 1 MG X 42 tablet   4. Essential hypertension   stable, continue medication  metoprolol-hydrochlorothiazide (LOPRESSOR HCT) 50-25 MG per tablet    COMP METABOLIC PANEL   5. Encounter for screening colonoscopy  REFERRAL TO GASTROENTEROLOGY   6. Family history of early CAD  LIPID PROFILE   7. Family history of rectal cancer  REFERRAL TO GASTROENTEROLOGY   8. Obesity (BMI 30-39.9)         Educated in proper administration of medication(s) ordered today including safety, possible SE, risks, benefits, rationale and alternatives to therapy.   Followup: Pending labs             Please note that this dictation was created using voice recognition software. I have worked with consultants from the vendor as well as technical experts from [employer] to optimize the interface. I have made every reasonable attempt to correct obvious errors, but I expect that there are errors of grammar and possibly content that I did not discover before finalizing the note.

## 2017-07-12 ENCOUNTER — TELEPHONE (OUTPATIENT)
Dept: MEDICAL GROUP | Facility: MEDICAL CENTER | Age: 57
End: 2017-07-12

## 2017-07-12 NOTE — TELEPHONE ENCOUNTER
1. Caller Name: Maritza from [facility]                                         Call Back Number: [phone]      Patient approves a detailed voicemail message: yes and N\A    Calling to report they are working on Pt's PA for Chantix, needs stop smoking certificate faxed.   Fax: [phone]

## 2017-07-12 NOTE — TELEPHONE ENCOUNTER
The pt needs to go to [email or web address] and fill it then bring it to us.  Want met to call pt? And inform her?

## 2017-07-13 NOTE — TELEPHONE ENCOUNTER
Pt called back, website address provided. Pt was very unhappy that this needs to be done. 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 Notified Pt she could go to a local library to print this or possibly try to print this out at work during break. Pt will call back and let us know if she is able to provide this certificate.

## 2017-07-14 NOTE — TELEPHONE ENCOUNTER
Left message for patient to call back at [phone].  To check status if Pt was able to go online to print this certificate.

## 2017-07-18 ENCOUNTER — HOSPITAL ENCOUNTER (OUTPATIENT)
Dept: LAB | Facility: MEDICAL CENTER | Age: 57
End: 2017-07-18
Attending: NURSE PRACTITIONER
Payer: COMMERCIAL

## 2017-07-18 ENCOUNTER — PATIENT MESSAGE (OUTPATIENT)
Dept: MEDICAL GROUP | Facility: MEDICAL CENTER | Age: 57
End: 2017-07-18

## 2017-07-18 DIAGNOSIS — I10 ESSENTIAL HYPERTENSION: ICD-10-CM

## 2017-07-18 DIAGNOSIS — K57.32 DIVERTICULITIS OF LARGE INTESTINE WITHOUT PERFORATION OR ABSCESS WITHOUT BLEEDING: ICD-10-CM

## 2017-07-18 DIAGNOSIS — Z82.49 FAMILY HISTORY OF EARLY CAD: ICD-10-CM

## 2017-07-18 DIAGNOSIS — Z00.00 ANNUAL PHYSICAL EXAM: ICD-10-CM

## 2017-07-18 LAB
ALBUMIN SERPL BCP-MCNC: 3.8 G/DL (ref 3.2–4.9)
ALBUMIN/GLOB SERPL: 1.4 G/DL
ALP SERPL-CCNC: 67 U/L (ref 30–99)
ALT SERPL-CCNC: 12 U/L (ref 2–50)
ANION GAP SERPL CALC-SCNC: 7 MMOL/L (ref 0–11.9)
AST SERPL-CCNC: 11 U/L (ref 12–45)
BILIRUB SERPL-MCNC: 0.9 MG/DL (ref 0.1–1.5)
BUN SERPL-MCNC: 11 MG/DL (ref 8–22)
CALCIUM SERPL-MCNC: 9.5 MG/DL (ref 8.5–10.5)
CHLORIDE SERPL-SCNC: 109 MMOL/L (ref 96–112)
CHOLEST SERPL-MCNC: 172 MG/DL (ref 100–199)
CO2 SERPL-SCNC: 25 MMOL/L (ref 20–33)
CREAT SERPL-MCNC: 0.55 MG/DL (ref 0.5–1.4)
GFR SERPL CREATININE-BSD FRML MDRD: >60 ML/MIN/1.73 M 2
GLOBULIN SER CALC-MCNC: 2.7 G/DL (ref 1.9–3.5)
GLUCOSE SERPL-MCNC: 94 MG/DL (ref 65–99)
HDLC SERPL-MCNC: 32 MG/DL
LDLC SERPL CALC-MCNC: 113 MG/DL
POTASSIUM SERPL-SCNC: 3.9 MMOL/L (ref 3.6–5.5)
PROT SERPL-MCNC: 6.5 G/DL (ref 6–8.2)
SODIUM SERPL-SCNC: 141 MMOL/L (ref 135–145)
TRIGL SERPL-MCNC: 134 MG/DL (ref 0–149)

## 2017-07-18 PROCEDURE — 80053 COMPREHEN METABOLIC PANEL: CPT

## 2017-07-18 PROCEDURE — 80061 LIPID PANEL: CPT

## 2017-07-18 PROCEDURE — 36415 COLL VENOUS BLD VENIPUNCTURE: CPT

## 2017-07-18 RX ORDER — CIPROFLOXACIN 500 MG/1
500 TABLET, FILM COATED ORAL 2 TIMES DAILY
Qty: 14 TAB | Refills: 0 | Status: SHIPPED | OUTPATIENT
Start: 2017-07-18 | End: 2017-11-03

## 2017-07-18 RX ORDER — METRONIDAZOLE 500 MG/1
500 TABLET ORAL 3 TIMES DAILY
Qty: 21 TAB | Refills: 0 | Status: SHIPPED | OUTPATIENT
Start: 2017-07-18 | End: 2017-11-03

## 2017-07-18 NOTE — TELEPHONE ENCOUNTER
From: Tracee De La Cruz  To: MARIA T Virgen  Sent: 7/18/2017 9:25 AM PDT  Subject: Prescription Question    Thank you!   I'm a bit concerned about the frequency of flare up. The symptoms are no worse but it's not the way I planned on spending my vacation =/  ----- Message -----  From: MAIRA T Virgen  Sent: 7/18/2017 7:32 AM PDT  To: Tracee De La Cruz  Subject: RE: Prescription Question    Tracee,  I will send cipro and flagyl to your pharmacy. Get started on those right away, let me know how you're doing in 2-3 days. ER if you get worse.  Lidia APARICIO      ----- Message -----   From: TRACEE DE LA CRUZ   Sent: 7/18/2017 6:38 AM PDT   To: MARIA T Virgen  Subject: Prescription Question    Adrian Escalona,    I'm scheduled for my lab draw this morning at 09:45 at [facility] however, I awoke with the acute pain of another diverticulitis flare up. The lab results should be available to you before noon. That stated, I am hoping you can call in the antibiotics for me.   It's the same as usual; the pain is the early variety and not yet debilitating, no blood in stool this a.m., no N/V/D, and no fever. I have my appointment with GI though their first available puts that in August.   Thank you,  Tracee

## 2017-07-28 ENCOUNTER — PATIENT MESSAGE (OUTPATIENT)
Dept: MEDICAL GROUP | Facility: MEDICAL CENTER | Age: 57
End: 2017-07-28

## 2017-07-28 NOTE — Clinical Note
July 31, 2017        RE: Charlene Min    To Whom It May Concern;    Charlene Min is seen in my office for primary care. She has recently been treated for an acute flare of diverticulitis, a chronic problem further explained in her [place] paperwork. Please excuse her work absence 7/27/17.    Please contact me with any questions.    Sincerely,          Iqra APARICIO

## 2017-07-31 ENCOUNTER — TELEPHONE (OUTPATIENT)
Dept: MEDICAL GROUP | Facility: MEDICAL CENTER | Age: 57
End: 2017-07-31

## 2017-07-31 NOTE — TELEPHONE ENCOUNTER
Regarding: RE: Non-Urgent Medical Question  Contact: [phone]  ----- Message from MARIA T Virgen sent at 7/31/2017 10:29 AM PDT -----       ----- Message sent from MARIA T Virgen to Tracee De La Cruz at 7/31/2017 10:29 AM -----   Your letter was sent this morning.  Lidia APARICIO      ----- Message -----     From: TRACEE DE LA CRUZ     Sent: 7/28/2017  6:24 AM PDT       To: MARIA T Virgen  Subject: Non-Urgent Medical Question    Adrian Escalona,   I missed work last night d/t digestive upset 2nd to wrapping up the antibiotics for the diverticulitis flare up. For FMLA to cover my missed shift, I need a note blaming the Acute on Chronic flare up.  Would you be able to fax something to [phone], ATTN: Hayden Petty, Manager - I can come in if need be.   I have my appointment with the Gastroenterologist on Aug 3rd and hope to be okay between now and then.   Thank you,  Tracee Pako

## 2017-09-09 ENCOUNTER — TELEPHONE (OUTPATIENT)
Dept: OCCUPATIONAL MEDICINE | Facility: CLINIC | Age: 57
End: 2017-09-09

## 2017-09-28 ENCOUNTER — EH NON-PROVIDER (OUTPATIENT)
Dept: OCCUPATIONAL MEDICINE | Facility: CLINIC | Age: 57
End: 2017-09-28

## 2017-09-28 PROCEDURE — 94375 RESPIRATORY FLOW VOLUME LOOP: CPT

## 2017-10-12 ENCOUNTER — IMMUNIZATION (OUTPATIENT)
Dept: OCCUPATIONAL MEDICINE | Facility: CLINIC | Age: 57
End: 2017-10-12

## 2017-10-12 DIAGNOSIS — Z23 NEED FOR VACCINATION: ICD-10-CM

## 2017-10-12 PROCEDURE — 90686 IIV4 VACC NO PRSV 0.5 ML IM: CPT | Performed by: PREVENTIVE MEDICINE

## 2017-11-03 ENCOUNTER — OFFICE VISIT (OUTPATIENT)
Dept: MEDICAL GROUP | Facility: MEDICAL CENTER | Age: 57
End: 2017-11-03
Payer: COMMERCIAL

## 2017-11-03 VITALS
SYSTOLIC BLOOD PRESSURE: 124 MMHG | HEIGHT: 65 IN | DIASTOLIC BLOOD PRESSURE: 80 MMHG | OXYGEN SATURATION: 97 % | TEMPERATURE: 97.6 F | HEART RATE: 61 BPM | WEIGHT: 239 LBS | BODY MASS INDEX: 39.82 KG/M2

## 2017-11-03 DIAGNOSIS — Z12.11 ENCOUNTER FOR FIT (FECAL IMMUNOCHEMICAL TEST) SCREENING: ICD-10-CM

## 2017-11-03 DIAGNOSIS — I10 ESSENTIAL HYPERTENSION: ICD-10-CM

## 2017-11-03 DIAGNOSIS — Z87.891 QUIT SMOKING WITHIN PAST YEAR: ICD-10-CM

## 2017-11-03 DIAGNOSIS — Z23 NEED FOR PNEUMOCOCCAL VACCINATION: ICD-10-CM

## 2017-11-03 DIAGNOSIS — E66.9 OBESITY (BMI 30-39.9): ICD-10-CM

## 2017-11-03 DIAGNOSIS — F32.A MILD EPISODE OF DEPRESSION: ICD-10-CM

## 2017-11-03 DIAGNOSIS — K57.32 DIVERTICULITIS OF LARGE INTESTINE WITHOUT PERFORATION OR ABSCESS WITHOUT BLEEDING: ICD-10-CM

## 2017-11-03 PROCEDURE — 90471 IMMUNIZATION ADMIN: CPT | Performed by: NURSE PRACTITIONER

## 2017-11-03 PROCEDURE — 90670 PCV13 VACCINE IM: CPT | Performed by: NURSE PRACTITIONER

## 2017-11-03 PROCEDURE — 99214 OFFICE O/P EST MOD 30 MIN: CPT | Mod: 25 | Performed by: NURSE PRACTITIONER

## 2017-11-03 RX ORDER — BUPROPION HYDROCHLORIDE 150 MG/1
150 TABLET, EXTENDED RELEASE ORAL DAILY
Qty: 30 TAB | Refills: 1 | Status: SHIPPED | OUTPATIENT
Start: 2017-11-03 | End: 2018-02-24

## 2017-11-03 NOTE — PROGRESS NOTES
"Subjective:     Chief Complaint   Patient presents with   • Follow-Up     DIscuss Pneumonia vaccine      Charlene Min is a 57 y.o. female here today to follow up on:    Quit smoking within past year  Quit smoking in July  On her last month of chantix, now tapering dose down and doing well      Mild episode of depression  Last few years  Finds herself to be more reclusive, unmotivated, more emotional eating  Interested in trying low dose of wellbutrin  No si/hi, hx of manic behavior, no hx of seizure    Diverticulitis of large intestine without perforation or abscess without bleeding  Has had several flares  She notes improvement since quitting smoking  Was seen by GI    Obesity (BMI 30-39.9)  More emotional eating, mainly sweets  No regular exercise, has felt unmotivated    Essential hypertension  Stable with lopressor  No dizziness, chest pain     Due for mammogram and colonoscopy. She requests FIT test  Current medicines (including changes today)  Current Outpatient Prescriptions   Medication Sig Dispense Refill   • buPROPion SR (WELLBUTRIN-SR) 150 MG TABLET SR 12 HR sustained-release tablet Take 1 Tab by mouth every day. 30 Tab 1   • varenicline (CHANTIX) 1 MG tablet Take 1 Tab by mouth 2 times a day. 60 Tab 3   • metoprolol-hydrochlorothiazide (LOPRESSOR HCT) 50-25 MG per tablet Take 1 Tab by mouth every day. 30 Tab 5   • hydrocodone-acetaminophen (NORCO) 5-325 MG Tab per tablet Take 1-2 Tabs by mouth every 6 hours as needed. 20 Tab 0     No current facility-administered medications for this visit.      She  has a past medical history of Hypertension.    ROS included above     Objective:     Blood pressure 124/80, pulse 61, temperature 36.4 °C (97.6 °F), height 1.651 m (5' 5\"), weight 108.4 kg (239 lb), SpO2 97 %. Body mass index is 39.77 kg/m².     Physical Exam:  General: Alert, oriented in no acute distress.  Eye contact is good, speech is normal, affect calm  Lungs: clear to auscultation bilaterally, normal " effort, no wheeze/ rhonchi/ rales.  CV: regular rate and rhythm, S1, S2, no murmur  Abdomen: soft, nontender  Ext: no edema, color normal, vascularity normal, temperature normal    Assessment and Plan:   The following treatment plan was discussed   1. Quit smoking within past year  Quit smoking 4 months ago and doing well, enc to continue with complete abstinence. Now tapering down on chantix   2. Mild episode of depression  Slight depression, feeling unmotivated. Interested in trying wellbutrin which she hopes will also help her emotional eating. Will start 150 mg daily, discussed need for sustained therapy to obtain results. Risks, benefits and SE reviewed   3. Diverticulitis of large intestine without perforation or abscess without bleeding  No recent issues   4. Obesity (BMI 30-39.9)  Patient identified as having weight management issue.  Appropriate orders and counseling given.   5. Essential hypertension  stable   6. Encounter for FIT (fecal immunochemical test) screening  OCCULT BLOOD, FECAL IMMUNOASSAY   7. Need for pneumococcal vaccination  She has had pneumonia in the past and requests vaccine.  I have placed the below orders and discussed them with an approved delegating provider. The MA is performing the below orders under the direction of Dr. Chua  PNEUMOCOCCAL CONJUGATE VACCINE 13-VALENT       Followup: 3 months, sooner as needed         Please note that this dictation was created using voice recognition software. I have worked with consultants from the vendor as well as technical experts from [facility] [employer] to optimize the interface. I have made every reasonable attempt to correct obvious errors, but I expect that there are errors of grammar and possibly content that I did not discover before finalizing the note.

## 2017-11-03 NOTE — ASSESSMENT & PLAN NOTE
Last few years  Finds herself to be more reclusive, unmotivated, more emotional eating  Interested in trying low dose of wellbutrin  No si/hi, hx of manic behavior, no hx of seizure

## 2017-11-30 ENCOUNTER — PATIENT MESSAGE (OUTPATIENT)
Dept: MEDICAL GROUP | Facility: MEDICAL CENTER | Age: 57
End: 2017-11-30

## 2017-11-30 RX ORDER — BUPROPION HYDROCHLORIDE 300 MG/1
300 TABLET ORAL EVERY MORNING
Qty: 30 TAB | Refills: 1 | Status: SHIPPED | OUTPATIENT
Start: 2017-11-30 | End: 2018-01-30 | Stop reason: SDUPTHER

## 2017-11-30 NOTE — TELEPHONE ENCOUNTER
From: Charlene Min  To: MAIRA T Virgen  Sent: 11/30/2017 9:18 AM PST  Subject: Prescription Question    Hi Iqra,    I've taken Wellbutrin  mg Qday for a month now and though I do not feel any worse, I do not feel any better either. Before I refill, I wanted to request you review for something more or different.     Thank you,  Charlene Min

## 2017-12-13 ENCOUNTER — PATIENT MESSAGE (OUTPATIENT)
Dept: MEDICAL GROUP | Facility: MEDICAL CENTER | Age: 57
End: 2017-12-13

## 2017-12-13 RX ORDER — CIPROFLOXACIN 500 MG/1
500 TABLET, FILM COATED ORAL 2 TIMES DAILY
Qty: 14 TAB | Refills: 0 | Status: SHIPPED | OUTPATIENT
Start: 2017-12-13 | End: 2017-12-20

## 2017-12-13 RX ORDER — METRONIDAZOLE 500 MG/1
500 TABLET ORAL 3 TIMES DAILY
Qty: 21 TAB | Refills: 0 | Status: SHIPPED | OUTPATIENT
Start: 2017-12-13 | End: 2017-12-20

## 2017-12-13 NOTE — TELEPHONE ENCOUNTER
"From: Charlene Min  To: MARIA T Virgen  Sent: 12/13/2017 8:34 AM PST  Subject: Prescription Question    Good morning Ms. Benavides,    I called out of work on last night with a bout of LLQ pain. Oddly, it has eased up this morning which is unusual for my diverticulitis flair ups. I'm taking Tylenol however, I know I'll need antibiotics to clear it up. I'm hopeful you won't mind calling in the prescriptions. The gastroenterologist concluded to \"keep doing what [I've] been doing\" with my PCP as it is working.     Thank you for consideration of this request,    Charlene Min  "

## 2018-01-03 ENCOUNTER — OFFICE VISIT (OUTPATIENT)
Dept: URGENT CARE | Facility: CLINIC | Age: 58
End: 2018-01-03
Payer: COMMERCIAL

## 2018-01-03 VITALS
HEART RATE: 60 BPM | BODY MASS INDEX: 39.99 KG/M2 | WEIGHT: 240 LBS | TEMPERATURE: 97.6 F | HEIGHT: 65 IN | RESPIRATION RATE: 18 BRPM | SYSTOLIC BLOOD PRESSURE: 130 MMHG | DIASTOLIC BLOOD PRESSURE: 85 MMHG | OXYGEN SATURATION: 95 %

## 2018-01-03 DIAGNOSIS — M54.10 RADICULOPATHY OF ARM: ICD-10-CM

## 2018-01-03 DIAGNOSIS — M54.2 NECK PAIN: ICD-10-CM

## 2018-01-03 PROCEDURE — 99214 OFFICE O/P EST MOD 30 MIN: CPT | Performed by: NURSE PRACTITIONER

## 2018-01-03 RX ORDER — HYDROCODONE BITARTRATE AND ACETAMINOPHEN 5; 325 MG/1; MG/1
1-2 TABLET ORAL EVERY 6 HOURS PRN
Qty: 20 TAB | Refills: 0 | Status: SHIPPED | OUTPATIENT
Start: 2018-01-03 | End: 2018-01-13

## 2018-01-03 RX ORDER — PREDNISONE 10 MG/1
TABLET ORAL
Qty: 15 TAB | Refills: 0 | Status: SHIPPED | OUTPATIENT
Start: 2018-01-03 | End: 2018-02-24

## 2018-01-03 ASSESSMENT — ENCOUNTER SYMPTOMS
CHILLS: 0
TINGLING: 0
FEVER: 0
NUMBNESS: 0
NECK PAIN: 1

## 2018-01-04 NOTE — PROGRESS NOTES
"Subjective:      Charlene Min is a 57 y.o. female who presents with Pain (Neck pain radiated to left shoulder for 3 days)       Past Medical History:   Diagnosis Date   • Hypertension      Social History     Social History   • Marital status: Single     Spouse name: N/A   • Number of children: N/A   • Years of education: N/A     Occupational History   • Not on file.     Social History Main Topics   • Smoking status: Former Smoker     Types: Cigarettes   • Smokeless tobacco: Never Used   • Alcohol use No   • Drug use: No   • Sexual activity: Not on file     Other Topics Concern   • Not on file     Social History Narrative   • No narrative on file     Family History   Problem Relation Age of Onset   • Heart Disease Mother    • Hypertension Mother    • Lung Disease Father    • Cancer Brother      colorectal       Allergies: Pcn [penicillins]    Patient is a 57-year-old female who presents today with complaint of painful left side of the neck radiating to his left shoulder and left upper arm. She states this is happened before, and she has had a history of neck strain with radicular pain following. She has tried NSAIDs without relief.          Neck Pain    This is a new problem. The current episode started in the past 7 days. The problem occurs constantly. The problem has been unchanged. The quality of the pain is described as aching. The pain is moderate. Pertinent negatives include no fever, numbness or tingling. She has tried nothing for the symptoms. The treatment provided no relief.       Review of Systems   Constitutional: Negative for chills and fever.   Musculoskeletal: Positive for neck pain.   Neurological: Negative for tingling and numbness.   All other systems reviewed and are negative.         Objective:     /85   Pulse 60   Temp 36.4 °C (97.6 °F)   Resp 18   Ht 1.651 m (5' 5\")   Wt 108.9 kg (240 lb)   SpO2 95%   BMI 39.94 kg/m²      Physical Exam   Constitutional: She is oriented to person, " place, and time. She appears well-developed and well-nourished.   Musculoskeletal:        Arms:  Point tenderness to the left side of the neck radiating to the left shoulder and the left upper arm.     5/5 and equal in the upper extremities. Push/pull 5/5 and equal in the upper extremities.     Neurological: She is alert and oriented to person, place, and time.   Skin: Skin is warm and dry. Capillary refill takes less than 2 seconds.   Psychiatric: She has a normal mood and affect. Her behavior is normal. Judgment and thought content normal.   Vitals reviewed.              Assessment/Plan:     1. Neck pain  2. Radiculopathy of arm    -Norco; narcotic consent signed, or score 1, checked patient's  and find no evidence for narcotic misuse  -Prednisone  -Ice  -Follow up with PCP or neurology for persistent or worsening of symptoms

## 2018-01-23 ENCOUNTER — OFFICE VISIT (OUTPATIENT)
Dept: URGENT CARE | Facility: CLINIC | Age: 58
End: 2018-01-23
Payer: COMMERCIAL

## 2018-01-23 VITALS
HEART RATE: 64 BPM | OXYGEN SATURATION: 98 % | SYSTOLIC BLOOD PRESSURE: 120 MMHG | HEIGHT: 65 IN | RESPIRATION RATE: 18 BRPM | DIASTOLIC BLOOD PRESSURE: 78 MMHG | TEMPERATURE: 98 F | WEIGHT: 217 LBS | BODY MASS INDEX: 36.15 KG/M2

## 2018-01-23 DIAGNOSIS — K52.9 GASTROENTERITIS: ICD-10-CM

## 2018-01-23 DIAGNOSIS — K21.9 GASTROESOPHAGEAL REFLUX DISEASE WITHOUT ESOPHAGITIS: ICD-10-CM

## 2018-01-23 PROCEDURE — 99214 OFFICE O/P EST MOD 30 MIN: CPT | Performed by: NURSE PRACTITIONER

## 2018-01-23 RX ORDER — LOPERAMIDE HYDROCHLORIDE 2 MG/1
2 CAPSULE ORAL 4 TIMES DAILY PRN
Qty: 30 CAP | Refills: 0 | Status: SHIPPED | OUTPATIENT
Start: 2018-01-23 | End: 2018-02-24

## 2018-01-23 RX ORDER — ONDANSETRON 4 MG/1
4 TABLET, ORALLY DISINTEGRATING ORAL EVERY 8 HOURS PRN
Qty: 10 TAB | Refills: 0 | Status: SHIPPED | OUTPATIENT
Start: 2018-01-23 | End: 2018-02-24

## 2018-01-23 RX ORDER — CALCIUM CARBONATE 500 MG/1
500 TABLET, CHEWABLE ORAL DAILY
Qty: 30 TAB | Refills: 0 | Status: SHIPPED | OUTPATIENT
Start: 2018-01-23 | End: 2018-02-24

## 2018-01-23 ASSESSMENT — ENCOUNTER SYMPTOMS
VOMITING: 1
FEVER: 0
DIZZINESS: 0
CHILLS: 0
EYE PAIN: 0
CHANGE IN BOWEL HABIT: 1
SHORTNESS OF BREATH: 0
ABDOMINAL PAIN: 0
SORE THROAT: 0
MYALGIAS: 0
DIARRHEA: 1
ROS GI COMMENTS: 1
NAUSEA: 1
HEARTBURN: 1

## 2018-01-24 NOTE — PROGRESS NOTES
Subjective:     Charlene Min is a 57 y.o. female who presents for GI Problem (X 5 days ago nauseas, vomiting and diarrhea, still have diarrhea and acid reflux)       GI Problem   This is a new problem. Episode onset: 5 days. The problem occurs constantly. The problem has been unchanged. Associated symptoms include a change in bowel habit, nausea and vomiting. Pertinent negatives include no abdominal pain, chest pain, chills, congestion, fever, myalgias, rash or sore throat. Associated symptoms comments: Heart burn, diarrhea. The symptoms are aggravated by eating. She has tried rest for the symptoms. The treatment provided no relief.     Past Medical History:   Diagnosis Date   • Hypertension      Past Surgical History:   Procedure Laterality Date   • ABDOMINAL HYSTERECTOMY TOTAL       Social History     Social History   • Marital status: Single     Spouse name: N/A   • Number of children: N/A   • Years of education: N/A     Occupational History   • Not on file.     Social History Main Topics   • Smoking status: Former Smoker     Types: Cigarettes   • Smokeless tobacco: Never Used   • Alcohol use No   • Drug use: No   • Sexual activity: Not on file     Other Topics Concern   • Not on file     Social History Narrative   • No narrative on file      Family History   Problem Relation Age of Onset   • Heart Disease Mother    • Hypertension Mother    • Lung Disease Father    • Cancer Brother      colorectal    Review of Systems   Constitutional: Negative for chills and fever.   HENT: Negative for congestion and sore throat.    Eyes: Negative for pain.   Respiratory: Negative for shortness of breath.    Cardiovascular: Negative for chest pain.   Gastrointestinal: Positive for change in bowel habit, diarrhea, heartburn, nausea and vomiting. Negative for abdominal pain.        1   Genitourinary: Negative for hematuria.   Musculoskeletal: Negative for myalgias.   Skin: Negative for rash.   Neurological: Negative for  "dizziness.     Allergies   Allergen Reactions   • Pcn [Penicillins]       Objective:   /78   Pulse 64   Temp 36.7 °C (98 °F)   Resp 18   Ht 1.651 m (5' 5\")   Wt 98.4 kg (217 lb)   SpO2 98%   BMI 36.11 kg/m²   Physical Exam   Constitutional: She is oriented to person, place, and time. She appears well-developed and well-nourished. No distress.   HENT:   Head: Normocephalic and atraumatic.   Right Ear: Tympanic membrane normal.   Left Ear: Tympanic membrane normal.   Nose: Nose normal. Right sinus exhibits no maxillary sinus tenderness and no frontal sinus tenderness. Left sinus exhibits no maxillary sinus tenderness and no frontal sinus tenderness.   Mouth/Throat: Uvula is midline, oropharynx is clear and moist and mucous membranes are normal. No posterior oropharyngeal edema, posterior oropharyngeal erythema or tonsillar abscesses. No tonsillar exudate.   Eyes: Conjunctivae and EOM are normal. Pupils are equal, round, and reactive to light. Right eye exhibits no discharge. Left eye exhibits no discharge.   Cardiovascular: Normal rate and regular rhythm.    No murmur heard.  Pulmonary/Chest: Effort normal and breath sounds normal. No respiratory distress.   Abdominal: Soft. She exhibits no distension. Bowel sounds are increased. There is no hepatosplenomegaly. There is generalized tenderness and tenderness in the right upper quadrant and epigastric area. There is no rigidity, no rebound, no guarding, no tenderness at McBurney's point and negative Paul's sign.   Neurological: She is alert and oriented to person, place, and time. She has normal reflexes. No sensory deficit.   Skin: Skin is warm, dry and intact.   Psychiatric: She has a normal mood and affect.         Assessment/Plan:   Assessment    1. Gastroesophageal reflux disease without esophagitis  2. Gastroenteritis  - hyoscyamine-maalox plus-lidocaine viscous (GI COCKTAIL); Take 30 mL by mouth Once for 1 dose.  Dispense: 30 mL; Refill: 0  - " loperamide (IMODIUM) 2 MG Cap; Take 1 Cap by mouth 4 times a day as needed for Diarrhea.  Dispense: 30 Cap; Refill: 0  - calcium carbonate (CALCIUM CARBONATE) 500 MG Chew Tab; Take 1 Tab by mouth every day.  Dispense: 30 Tab; Refill: 0  - ondansetron (ZOFRAN ODT) 4 MG TABLET DISPERSIBLE; Take 1 Tab by mouth every 8 hours as needed.  Dispense: 10 Tab; Refill: 0    All medications and /or  Treatments that are ordered in this encounter were administered or done by provider  Patient having good relief of symptoms with GI cocktail.     Pt. is to start a 24 hour clear liquid diet, and is to avoid the consumption of red dye to monitor for blood in their stool. Once able to tolerate said diet, pt. is to start on the BRAT diet and avoid the consumption of dairy for a few days. Pt. is to increased fluids as tolerated. Once improved, strongly advised pt. to avoid greasy/fatty foods for a least a week, and encouraged a probiotic. Pt. Is to RTC if not improving/worsening or has any further concerns.    Patient given precautionary s/sx that mandate immediate follow up and evaluation in the ED. Advised of risks of not doing so.    DDX, Supportive care, and indications for immediate follow-up discussed with patient.    Instructed to return to clinic or nearest emergency department if we are not available for any change in condition, further concerns, or worsening of symptoms.    The patient demonstrated a good understanding and agreed with the treatment plan.

## 2018-01-30 RX ORDER — BUPROPION HYDROCHLORIDE 300 MG/1
300 TABLET ORAL EVERY MORNING
Qty: 30 TAB | Refills: 11 | Status: SHIPPED | OUTPATIENT
Start: 2018-01-30 | End: 2019-01-23 | Stop reason: SDUPTHER

## 2018-01-30 NOTE — TELEPHONE ENCOUNTER
----- Message from Charlene Min sent at 1/29/2018 11:42 PM PST -----  Regarding: Prescription Question  Contact: [phone]  Adrian Escalona,    The Wellbutrin 300mg is working without side effects and I have one more dose of the original prescription.  Would you call in a refill for me?      Thank you,  Charlene Min

## 2018-02-24 ENCOUNTER — APPOINTMENT (OUTPATIENT)
Dept: RADIOLOGY | Facility: MEDICAL CENTER | Age: 58
End: 2018-02-24
Attending: EMERGENCY MEDICINE
Payer: COMMERCIAL

## 2018-02-24 ENCOUNTER — NON-PROVIDER VISIT (OUTPATIENT)
Dept: OCCUPATIONAL MEDICINE | Facility: CLINIC | Age: 58
End: 2018-02-24
Payer: COMMERCIAL

## 2018-02-24 ENCOUNTER — HOSPITAL ENCOUNTER (EMERGENCY)
Facility: MEDICAL CENTER | Age: 58
End: 2018-02-24
Attending: EMERGENCY MEDICINE
Payer: COMMERCIAL

## 2018-02-24 VITALS
RESPIRATION RATE: 19 BRPM | OXYGEN SATURATION: 96 % | BODY MASS INDEX: 40.92 KG/M2 | WEIGHT: 245.59 LBS | DIASTOLIC BLOOD PRESSURE: 90 MMHG | HEIGHT: 65 IN | TEMPERATURE: 97.3 F | SYSTOLIC BLOOD PRESSURE: 164 MMHG | HEART RATE: 65 BPM
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
## 2018-02-24 DIAGNOSIS — S80.00XA CONTUSION OF KNEE, UNSPECIFIED LATERALITY, INITIAL ENCOUNTER: Primary | ICD-10-CM

## 2018-02-24 DIAGNOSIS — Z02.83 ENCOUNTER FOR DRUG SCREENING: ICD-10-CM

## 2018-02-24 DIAGNOSIS — S50.11XA CONTUSION OF RIGHT FOREARM, INITIAL ENCOUNTER: ICD-10-CM

## 2018-02-24 DIAGNOSIS — R07.81 PLEURITIC CHEST PAIN: ICD-10-CM

## 2018-02-24 DIAGNOSIS — M25.552 HIP PAIN, ACUTE, LEFT: ICD-10-CM

## 2018-02-24 DIAGNOSIS — Z02.1 PRE-EMPLOYMENT DRUG SCREENING: ICD-10-CM

## 2018-02-24 PROCEDURE — 80305 DRUG TEST PRSMV DIR OPT OBS: CPT | Performed by: INTERNAL MEDICINE

## 2018-02-24 PROCEDURE — 99284 EMERGENCY DEPT VISIT MOD MDM: CPT

## 2018-02-24 PROCEDURE — 71045 X-RAY EXAM CHEST 1 VIEW: CPT

## 2018-02-24 PROCEDURE — 700102 HCHG RX REV CODE 250 W/ 637 OVERRIDE(OP): Performed by: EMERGENCY MEDICINE

## 2018-02-24 PROCEDURE — 73502 X-RAY EXAM HIP UNI 2-3 VIEWS: CPT | Mod: RT

## 2018-02-24 PROCEDURE — 73564 X-RAY EXAM KNEE 4 OR MORE: CPT | Mod: LT

## 2018-02-24 PROCEDURE — 82075 ASSAY OF BREATH ETHANOL: CPT | Performed by: INTERNAL MEDICINE

## 2018-02-24 PROCEDURE — 99026 IN-HOSPITAL ON CALL SERVICE: CPT | Performed by: INTERNAL MEDICINE

## 2018-02-24 PROCEDURE — 700111 HCHG RX REV CODE 636 W/ 250 OVERRIDE (IP): Performed by: EMERGENCY MEDICINE

## 2018-02-24 PROCEDURE — 93005 ELECTROCARDIOGRAM TRACING: CPT | Performed by: EMERGENCY MEDICINE

## 2018-02-24 PROCEDURE — 90715 TDAP VACCINE 7 YRS/> IM: CPT | Performed by: EMERGENCY MEDICINE

## 2018-02-24 PROCEDURE — 73090 X-RAY EXAM OF FOREARM: CPT | Mod: RT

## 2018-02-24 PROCEDURE — 73564 X-RAY EXAM KNEE 4 OR MORE: CPT | Mod: RT

## 2018-02-24 PROCEDURE — A9270 NON-COVERED ITEM OR SERVICE: HCPCS | Performed by: EMERGENCY MEDICINE

## 2018-02-24 PROCEDURE — 90471 IMMUNIZATION ADMIN: CPT

## 2018-02-24 RX ORDER — HYDROCODONE BITARTRATE AND ACETAMINOPHEN 5; 325 MG/1; MG/1
1 TABLET ORAL ONCE
Status: COMPLETED | OUTPATIENT
Start: 2018-02-24 | End: 2018-02-24

## 2018-02-24 RX ORDER — HYDROCODONE BITARTRATE AND ACETAMINOPHEN 5; 325 MG/1; MG/1
1-2 TABLET ORAL EVERY 4 HOURS PRN
Qty: 10 TAB | Refills: 0 | Status: SHIPPED | OUTPATIENT
Start: 2018-02-24 | End: 2018-03-01

## 2018-02-24 RX ADMIN — CLOSTRIDIUM TETANI TOXOID ANTIGEN (FORMALDEHYDE INACTIVATED), CORYNEBACTERIUM DIPHTHERIAE TOXOID ANTIGEN (FORMALDEHYDE INACTIVATED), BORDETELLA PERTUSSIS TOXOID ANTIGEN (GLUTARALDEHYDE INACTIVATED), BORDETELLA PERTUSSIS FILAMENTOUS HEMAGGLUTININ ANTIGEN (FORMALDEHYDE INACTIVATED), BORDETELLA PERTUSSIS PERTACTIN ANTIGEN, AND BORDETELLA PERTUSSIS FIMBRIAE 2/3 ANTIGEN 0.5 ML: 5; 2; 2.5; 5; 3; 5 INJECTION, SUSPENSION INTRAMUSCULAR at 08:32

## 2018-02-24 RX ADMIN — HYDROCODONE BITARTRATE AND ACETAMINOPHEN 1 TABLET: 5; 325 TABLET ORAL at 10:57

## 2018-02-24 ASSESSMENT — PAIN SCALES - GENERAL: PAINLEVEL_OUTOF10: 6
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
## 2018-02-24 NOTE — ED PROVIDER NOTES
ED Provider Note    Addendum:    EKG. This was performed by the nursing staff because the patient had chest pain.     8:20 AM, 13-lead EKG, sinus tachycardia, rate approximately 75. Axis QRS 31. No ST elevations. Lots of artifact. No old EKG for comparison ,

## 2018-02-24 NOTE — LETTER
"  FORM C-4:  EMPLOYEE’S CLAIM FOR COMPENSATION/ REPORT OF INITIAL TREATMENT  EMPLOYEE’S CLAIM - PROVIDE ALL INFORMATION REQUESTED   First Name  Charlene Last Name  Pako Birthdate             Age  1960 57 y.o. 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 (if applicable)  Walking to Car Lot    How did this injury or occupational disease occur? Be specific and answer in detail. 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   If you believe that you have an occupational disease, when did you first have knowledge of the disability and it relationship to your employment?  N/A Witnesses to the Accident  Co-Workers     Nature of Injury or Occupational Disease  Workers' Compensation  Part(s) of Body Injured or Affected  Hip (R), Knee (L), Skull    I certify that " the above is true and correct to the best of my knowledge and that I have provided this information in order to obtain the benefits of [place] or Chapter 617 of [place]  I hereby authorize any physician, chiropractor, surgeon, practitioner, or other person, any hospital, including [facility] or [facility] hospital, any medical service organization, any insurance company, or other institution or organization to release to each other, any medical or other information, including benefits paid or payable, pertinent to this injury or disease, except information relative to diagnosis, treatment and/or counseling for AIDS, psychological conditions, alcohol or controlled substances, for which I must give specific authorization.  A Photostat of this authorization shall be as valid as the original.   Date Place   Employee’s Signature   THIS REPORT MUST BE COMPLETED AND MAILED WITHIN 3 WORKING DAYS OF TREATMENT   Place  [facility], EMERGENCY DEPT  Name of Facility   [facility]   Date  2/24/2018 Diagnosis  (W19.XXXA) Fall, initial encounter  (M25.552) Hip pain, acute, left  (R07.81) Pleuritic chest pain  (S50.11XA) Contusion of right forearm, initial encounter  (S80.00XA) Contusion of knee, unspecified laterality, initial encounter Is there evidence the injured employee was under the influence of alcohol and/or another controlled substance at the time of accident?   Hour  10:22 AM Description of Injury or Disease  Fall, initial encounter  Hip pain, acute, left  Pleuritic chest pain  Contusion of right forearm, initial encounter  Contusion of knee, unspecified laterality, initial encounter     Treatment     Have you advised the patient to remain off work five days or more?             X-Ray Findings      If Yes   From Date    To Date      From information given by the employee, together  "with medical evidence, can you directly connect this injury or occupational disease as job incurred?    If No, is the employee capable of: Full Duty    Modified Duty      Is additional medical care by a physician indicated?    If Modified Duty, Specify any Limitations / Restrictions        Do you know of any previous injury or disease contributing to this condition or occupational disease?      Date  2/24/2018 Print Doctor’s Name  Yandel Guido THAD ADEBAYO certify the employer’s copy of this form was mailed on:   Address  [address]  [facility] [address] [phone] Insurer’s Use Only   [facility]  [address]    Provider’s Tax ID Number    Telephone  Dept: [phone]    Doctor’s Signature    Degree       Original - TREATING PHYSICIAN OR CHIROPRACTOR   Pg 2-Insurer/TPA   Pg 3-Employer   Pg 4-Employee                                                                                                  Form C-4 (rev01/03)     BRIEF DESCRIPTION OF RIGHTS AND BENEFITS  (Pursuant to NRS 616C.050)    Notice of Injury or Occupational Disease (Incident Report Form C-1): If an injury or occupational disease (OD) arises out of and in the course of employment, you must provide written notice to your employer as soon as practicable, but no later than 7 days after the accident or OD. Your employer shall maintain a sufficient supply of the required forms.    Claim for Compensation (Form C-4): If medical treatment is sought, the form C-4 is available at the place of initial treatment. A completed \"Claim for Compensation\" (Form C-4) must be filed within 90 days after an accident or OD. The treating physician or chiropractor must, within 3 working days after treatment, complete and mail to the employer, the employer's insurer and third-party , the Claim for Compensation.    Medical Treatment: If you require medical treatment for your on-the-job injury or OD, you may be required to select a physician or " chiropractor from a list provided by your workers’ compensation insurer, if it has contracted with an Organization for Managed Care (MCO) or Preferred Provider Organization (PPO) or providers of health care. If your employer has not entered into a contract with an MCO or PPO, you may select a physician or chiropractor from the Panel of Physicians and Chiropractors. Any medical costs related to your industrial injury or OD will be paid by your insurer.    Temporary Total Disability (TTD): If your doctor has certified that you are unable to work for a period of at least 5 consecutive days, or 5 cumulative days in a 20-day period, or places restrictions on you that your employer does not accommodate, you may be entitled to TTD compensation.    Temporary Partial Disability (TPD): If the wage you receive upon reemployment is less than the compensation for TTD to which you are entitled, the insurer may be required to pay you TPD compensation to make up the difference. TPD can only be paid for a maximum of 24 months.    Permanent Partial Disability (PPD): When your medical condition is stable and there is an indication of a PPD as a result of your injury or OD, within 30 days, your insurer must arrange for an evaluation by a rating physician or chiropractor to determine the degree of your PPD. The amount of your PPD award depends on the date of injury, the results of the PPD evaluation and your age and wage.    Permanent Total Disability (PTD): If you are medically certified by a treating physician or chiropractor as permanently and totally disabled and have been granted a PTD status by your insurer, you are entitled to receive monthly benefits not to exceed 66 2/3% of your average monthly wage. The amount of your PTD payments is subject to reduction if you previously received a PPD award.    Vocational Rehabilitation Services: You may be eligible for vocational rehabilitation services if you are unable to return to the  job due to a permanent physical impairment or permanent restrictions as a result of your injury or occupational disease.    Transportation and Per Lai Reimbursement: You may be eligible for travel expenses and per lai associated with medical treatment.  Reopening: You may be able to reopen your claim if your condition worsens after claim closure.    Appeal Process: If you disagree with a written determination issued by the insurer or the insurer does not respond to your request, you may appeal to the Department of Administration, , by following the instructions contained in your determination letter. You must appeal the determination within 70 days from the date of the determination letter at [address], [address], [place], [place] [address], or [address], [address], [place], [place] [address]. If you disagree with the  decision, you may appeal to the Department of Administration, . You must file your appeal within 30 days from the date of the  decision letter at [address], [address], [place], [place] [address], or [address], [place], [place] [address]. If you disagree with a decision of an , you may file a petition for judicial review with the District Court. You must do so within 30 days of the Appeal Officer’s decision. You may be represented by an  at your own expense or you may contact the [employer] for possible representation.    [place]  for Injured Workers (NAIW): If you disagree with a  decision, you may request that NAIW represent you without charge at an  Hearing. For information regarding denial of benefits, you may contact the [employer] at: [address], [address], [place] [address], [phone], or [address], [address], [place] [address], [phone]    To File a Complaint with the Division: If you wish to file  a complaint with the  of the Division of Industrial Relations (DIR), please contact the Workers’ Compensation Section, [address], [address], [place], [place] [address], telephone [phone], or [address], [address], [place], [place] [address], telephone [phone].    For assistance with Workers’ Compensation Issues: you may contact the Office of the Governor Consumer Health Assistance, [address] [place] [address], [address], [place], [place] [address], Toll Free 1-417.113.4297, Web site: [email or web address]., E-mail [email or web address].                                                                                                                                                                               __________________________________________________________________                                    _________________            Employee Name / Signature                                                                                                                            Date                                       D-2 (rev. 10/07)

## 2018-02-24 NOTE — LETTER
"  FORM C-4:  EMPLOYEE’S CLAIM FOR COMPENSATION/ REPORT OF INITIAL TREATMENT  EMPLOYEE’S CLAIM - PROVIDE ALL INFORMATION REQUESTED   First Name  Charlene Last Name  Pako Birthdate             Age  1960 57 y.o. Sex  female Claim Number   Home Employee Address  [address]  [facility]                                     Zip  [address] Height  1.651 m (5' 5\") Weight  111.4 kg (245 lb 9.5 oz) [employer]     Mailing Employee Address                           [address]   [facility]               Zip  [address] Telephone  [phone] (home) [phone] (work) Primary Language Spoken  ENGLISH   Insurer  [employer] Third Party   WORKERS CHOICE Employee's Occupation (Job Title) When Injury or Occupational Disease Occurred  RN   Employer's Name  [employer] Telephone  [phone]    Employer Address  [address]] Zip  [address]   Date of Injury  2/24/2018       Hour of Injury  7:10 AM Date Employer Notified  2/24/2018 Last Day of Work after Injury or Occupational Disease  2/24/2018 Supervisor to Whom Injury Reported  [facility]   Address or Location of Accident (if applicable)  [address], [address] [place] [address]   What were you doing at the time of accident? (if applicable)  Walking to Car Lot    How did this injury or occupational disease occur? Be specific and answer in detail. 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   If you believe that you have an occupational disease, when did you first have knowledge of the disability and it relationship to your employment?  N/A Witnesses to the Accident  Co-Workers     Nature of Injury or Occupational Disease  Workers' Compensation  Part(s) of Body Injured or " Affected  Hip (R), Knee (L), Skull    I certify that the above is true and correct to the best of my knowledge and that I have provided this information in order to obtain the benefits of [place]’s [place] 616A [place], inclusive or Chapter 617 of [place]RS).  I hereby authorize any physician, chiropractor, surgeon, practitioner, or other person, any hospital, including [facility] or [facility], any medical service organization, any insurance company, or other institution or organization to release to each other, any medical or other information, including benefits paid or payable, pertinent to this injury or disease, except information relative to diagnosis, treatment and/or counseling for AIDS, psychological conditions, alcohol or controlled substances, for which I must give specific authorization.  A Photostat of this authorization shall be as valid as the original.   Date  02/24/2018 [facility]   Employee’s Signature   THIS REPORT MUST BE COMPLETED AND MAILED WITHIN 3 WORKING DAYS OF TREATMENT   Place  [facility], EMERGENCY DEPT  Name of Facility   [facility]   Date  2/24/2018 Diagnosis  (S80.00XA) Contusion of knee, unspecified laterality, initial encounter  (primary encounter diagnosis)  (W19.XXXA) Fall, initial encounter  (M25.552) Hip pain, acute, left  (R07.81) Pleuritic chest pain  (S50.11XA) Contusion of right forearm, initial encounter Is there evidence the injured employee was under the influence of alcohol and/or another controlled substance at the time of accident?   Hour  10:50 AM Description of Injury or Disease  Fall, initial encounter  Hip pain, acute, left  Pleuritic chest pain  Contusion of right forearm, initial encounter  Contusion of knee, unspecified laterality, initial encounter No   Treatment  Exam and x rays  Have you advised the patient to remain off work  "five days or more?         No   X-Ray Findings  Negative   If Yes   From Date    To Date      From information given by the employee, together with medical evidence, can you directly connect this injury or occupational disease as job incurred?  Yes If No, is the employee capable of: Full Duty  No Modified Duty      Is additional medical care by a physician indicated?  Yes  Comments:occ care If Modified Duty, Specify any Limitations / Restrictions  Off work for 3 days     Do you know of any previous injury or disease contributing to this condition or occupational disease?  No   Date  2/24/2018 Print Doctor’s Name  Guido Humphries I certify the employer’s copy of this form was mailed on:   Address  [address]  [facility] [id number] [phone] Insurer’s Use Only   [facility]  [address]    Provider’s Tax ID Number    Telephone  Dept: [phone]    Doctor’s Signature  e-GUIDO Patel M.D. Degree  M.D.    Original - TREATING PHYSICIAN OR CHIROPRACTOR   Pg 2-Insurer/TPA   Pg 3-Employer   Pg 4-Employee                                                                                                  Form C-4 (rev01/03)     BRIEF DESCRIPTION OF RIGHTS AND BENEFITS  ([place]ursuant to [place]RS 616C.050)    Notice of Injury or Occupational Disease (Incident Report Form C-1): If an injury or occupational disease (OD) arises out of and in the course of employment, you must provide written notice to your employer as soon as practicable, but no later than 7 days after the accident or OD. Your employer shall maintain a sufficient supply of the required forms.    Claim for Compensation (Form C-4): If medical treatment is sought, the form C-4 is available at the place of initial treatment. A completed \"Claim for Compensation\" (Form C-4) must be filed within 90 days after an accident or OD. The treating physician or chiropractor must, within 3 working days after treatment, complete and mail to the employer, the " employer's insurer and third-party , the Claim for Compensation.    Medical Treatment: If you require medical treatment for your on-the-job injury or OD, you may be required to select a physician or chiropractor from a list provided by your workers’ compensation insurer, if it has contracted with an Organization for Managed Care (MCO) or Preferred Provider Organization (PPO) or providers of health care. If your employer has not entered into a contract with an MCO or PPO, you may select a physician or chiropractor from the Panel of Physicians and Chiropractors. Any medical costs related to your industrial injury or OD will be paid by your insurer.    Temporary Total Disability (TTD): If your doctor has certified that you are unable to work for a period of at least 5 consecutive days, or 5 cumulative days in a 20-day period, or places restrictions on you that your employer does not accommodate, you may be entitled to TTD compensation.    Temporary Partial Disability (TPD): If the wage you receive upon reemployment is less than the compensation for TTD to which you are entitled, the insurer may be required to pay you TPD compensation to make up the difference. TPD can only be paid for a maximum of 24 months.    Permanent Partial Disability (PPD): When your medical condition is stable and there is an indication of a PPD as a result of your injury or OD, within 30 days, your insurer must arrange for an evaluation by a rating physician or chiropractor to determine the degree of your PPD. The amount of your PPD award depends on the date of injury, the results of the PPD evaluation and your age and wage.    Permanent Total Disability (PTD): If you are medically certified by a treating physician or chiropractor as permanently and totally disabled and have been granted a PTD status by your insurer, you are entitled to receive monthly benefits not to exceed 66 2/3% of your average monthly wage. The amount of your  PTD payments is subject to reduction if you previously received a PPD award.    Vocational Rehabilitation Services: You may be eligible for vocational rehabilitation services if you are unable to return to the job due to a permanent physical impairment or permanent restrictions as a result of your injury or occupational disease.    Transportation and Per Lai Reimbursement: You may be eligible for travel expenses and per lai associated with medical treatment.  Reopening: You may be able to reopen your claim if your condition worsens after claim closure.    Appeal Process: If you disagree with a written determination issued by the insurer or the insurer does not respond to your request, you may appeal to the Department of Administration, , by following the instructions contained in your determination letter. You must appeal the determination within 70 days from the date of the determination letter at [address], [address], [place], [place] [address], or [address], [address], [place] [address]. If you disagree with the  decision, you may appeal to the Department of Administration, . You must file your appeal within 30 days from the date of the  decision letter at [address], [address], [place], [place] [address], or [address], [address], [place], [place] [address]. If you disagree with a decision of an , you may file a petition for judicial review with the District Court. You must do so within 30 days of the Appeal Officer’s decision. You may be represented by an  at your own expense or you may contact the [facility] for possible representation.    [place]  for Injured Workers ([place]IW): If you disagree with a  decision, you may request that NAIW represent you without charge at an  Hearing. For information regarding denial of benefits, you may contact the [facility]  at: [address], [address], [place] [address], [phone], or [address], [place] [address], [phone]    To File a Complaint with the Division: If you wish to file a complaint with the  of the Division of Industrial Relations (DIR), please contact the Workers’ Compensation Section, [address], [address], [place], [place] [address], telephone [phone]) [phone], or [address], [address], [place], [place] [address], telephone [phone].    For assistance with Workers’ Compensation Issues: you may contact the Office of the Governor Consumer Health Assistance, [address] [place] [address], [address], [place], [place] [address], Toll Free 1-531.910.4355, Web site: [email or web address].[email or web address]., E-mail [email or web address].                                                                                                                                                                               __________________________________________________________________                                  02/24/2018            Employee Name / Signature                                                                                                                            Date                                       D-2 (rev. 10/07)

## 2018-02-24 NOTE — ED PROVIDER NOTES
ED Provider Note    CHIEF COMPLAINT  Chief Complaint   Patient presents with   • Fall   • Closed Head Injury   • Knee Pain     R knee worse than L. R knee has abrasion to it.   • Chest Pain   • Hip Pain     R hip       HPI  Charlene Min is a 57 y.o. female who presents fall. Patient states that she was on the sidewalk and fell. She slipped. Eyelid on her right side. Complains of right forearm pain right hip pain bilateral knee pain. Abrasions to her right cheek and right knee and shin. She denies headache neck pain or facial pain weakness or numbness. Although she states that she struck the right side of her face currently does not have pain there but some swelling. Moving around makes her arm and hip more painful. Patient states that she also hurt her right chest when her elbow struck then felt some tightness in her chest immediately kind of like the wind was knocked out of her lasted 30 seconds now fine pinch had no substernal chest pain or pressure.   Nothing MAKES THEM BETTER. Moving around makes the pain worse.    REVIEW OF SYSTEMS  CONSTITUTIONAL:  Denies weakness  EYES:  Deniesdischarge   ENT:  Denies sore throat, nose or ear pain  CARDIOVASCULAR:  Denies chest pain, palpitations or swelling  RESPIRATORY:  Denies cough or shortness of breath or difficulty breathing  GI:  Denies abdominal pain, nausea, vomiting, or diarrhea  MUSCULOSKELETAL:  Denies weakness joint swelling or back pain  SKIN:  No rash  ALLERGIC: No itchy rashes.  NEUROLOGIC:  Denies headache, focal weakness or numbness  ENDOCRINE:  Denies polyuria or polydypsia  LYMPHATIC:  Denies swollen lymph nodes  PSYCHIATRIC:  Denies depression, suicidal ideation or homicial ideation  All other systems are negative    PAST MEDICAL HISTORY  Past Medical History:   Diagnosis Date   • Hypertension        FAMILY HISTORY  Family History   Problem Relation Age of Onset   • Heart Disease Mother    • Hypertension Mother    • Lung Disease Father    • Cancer Brother  "     colorectal       SOCIAL HISTORY   reports that she has been smoking Cigarettes.  She has never used smokeless tobacco. She reports that she does not drink alcohol or use drugs.    SURGICAL HISTORY  Past Surgical History:   Procedure Laterality Date   • ABDOMINAL HYSTERECTOMY TOTAL         CURRENT MEDICATIONS  No current facility-administered medications for this encounter.     Current Outpatient Prescriptions:   •  HYDROcodone-acetaminophen (NORCO) 5-325 MG Tab per tablet, Take 1-2 Tabs by mouth every four hours as needed for up to 5 days., Disp: 10 Tab, Rfl: 0  •  buPROPion (WELLBUTRIN XL) 300 MG XL tablet, Take 1 Tab by mouth every morning., Disp: 30 Tab, Rfl: 11  •  metoprolol-hydrochlorothiazide (LOPRESSOR HCT) 50-25 MG per tablet, Take 1 Tab by mouth every day. (Patient taking differently: Take 0.5 Tabs by mouth every day.), Disp: 30 Tab, Rfl: 5      ALLERGIES  Allergies   Allergen Reactions   • Pcn [Penicillins]          PHYSICAL EXAM  VITAL SIGNS: BP (!) 164/90   Pulse 67   Temp 36.3 °C (97.3 °F)   Resp 16   Ht 1.651 m (5' 5\")   Wt 111.4 kg (245 lb 9.5 oz)   SpO2 98%   BMI 40.87 kg/m²      Constitutional: Patient is awake alert person place and time. No acute respiratory distress Well developed, Well nourished,   HENT: Normocephalic, positive abrasion and redness to her right cheek but no bony tenderness Bilateral external ears normal, Oropharynx pink moist with no exudates, Nose patent.   Eyes: PERRLA, EOMI, Sclera and conjunctiva clear, No discharge.   Neck:  Supple no nuchal rigidity Non-tender  Cardiovascular: Heart is regular rate and rhythm no murmur,  Thorax & Lungs: Chest is symmetrical, with good breath sounds. No wheezing or crackles. No respiratory distress. Positive right-sided rib cage pain but no crepitance   Abdomen:  Soft, No tenderness  Skin: Warm, Dry, positive redness and abrasion to her right cheek  Back: Non tender with palpation, No CVA tenderness.   Extremities: No clubbing " cyanosis or edema.  Non tender.   Musculoskeletal: Positive pain and swelling to the right mid forearm, tenderness to the right hip with range of motion, bilateral knee abrasions and tenderness right greater than left.   Neurologic: Alert & oriented  since is intact light touch arms and legs. She is able to move her arms well she can move her legs as well although she does have tenderness to the knees themselves that the string seems to be symmetrical just limited by pain..   Psychiatric:  Cooperative friendly      RADIOLOGY/PROCEDURES  DX-CHEST-PORTABLE (1 VIEW)   Final Result      No evidence of acute cardiopulmonary process.      DX-FOREARM RIGHT   Final Result      No evidence of fracture or dislocation.      DX-KNEE COMPLETE 4+ LEFT   Final Result      1.  No evidence of acute fracture or dislocation.      2.  Calcification adjacent to the medial tibia consistent with old trauma possibly representing old medial collateral ligament injury.      DX-KNEE COMPLETE 4+ RIGHT   Final Result      1.  No evidence of acute fracture or dislocation.      2.  Mild degenerative change.      DX-HIP-COMPLETE - UNILATERAL 2+ RIGHT   Final Result      Degenerative change of the hip articulations, right greater than left. No evidence of fracture.            COURSE & MEDICAL DECISION MAKING  Pertinent Labs & Imaging studies reviewed. (See chart for details)  Patient who had a ground-level fall today and she now has pain to the right forearm which I believe the fusion. She also has pain to her right lateral rib cage area that could be an occult rib fracture. Also pain to her right hip could be an occult fracture as well and bilateral knee pain and contusions and abrasions. This time I feel we could discharge her home for close follow-up as an outpatient through occupational clinic on Monday. She will have rest ice elevation of her extremities.  And she understands could have occult fractures.    She is discharged in stable  condition    FINAL IMPRESSION  1. Fall  2. Contusion to the right forearm bilateral knees  3. Right hip pain rule out occult fracture  4. Abrasions bilateral knees     PLAN  1. Rest ice elevation/tetanus shot  2. Follow-up [facility] occupational clinic Monday  3. Working out 3 days  4. Return to the emergency department for increased pains, fevers, vomiting or change in condition.  Electronically signed by: Guido Humphries, 2/24/2018 8:21 AM

## 2018-02-24 NOTE — LETTER
"  FORM C-4:  EMPLOYEE’S CLAIM FOR COMPENSATION/ REPORT OF INITIAL TREATMENT  EMPLOYEE’S CLAIM - PROVIDE ALL INFORMATION REQUESTED   First Name  Charlene Last Name  Pako Birthdate             Age  1960 57 y.o. 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 (if applicable)  Walking to Car Lot    How did this injury or occupational disease occur? Be specific and answer in detail. 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   If you believe that you have an occupational disease, when did you first have knowledge of the disability and it relationship to your employment?  N/A Witnesses to the Accident  Co-Workers     Nature of Injury or Occupational Disease  Workers' Compensation  Part(s) of Body Injured or Affected  Hip (R), Knee (L), Skull    I certify that " the above is true and correct to the best of my knowledge and that I have provided this information in order to obtain the benefits of [place]  I hereby authorize any physician, chiropractor, surgeon, practitioner, or other person, any hospital, including [facility] or [facility] hospital, any medical service organization, any insurance company, or other institution or organization to release to each other, any medical or other information, including benefits paid or payable, pertinent to this injury or disease, except information relative to diagnosis, treatment and/or counseling for AIDS, psychological conditions, alcohol or controlled substances, for which I must give specific authorization.  A Photostat of this authorization shall be as valid as the original.   Date Place   Employee’s Signature   THIS REPORT MUST BE COMPLETED AND MAILED WITHIN 3 WORKING DAYS OF TREATMENT   Place  [facility] [place] [facility], EMERGENCY DEPT  Name of Facility   [facility] [place] [facility]   Date  2/24/2018 Diagnosis  No diagnosis found. Is there evidence the injured employee was under the influence of alcohol and/or another controlled substance at the time of accident?   Hour  9:46 AM Description of Injury or Disease       Treatment     Have you advised the patient to remain off work five days or more?             X-Ray Findings      If Yes   From Date    To Date      From information given by the employee, together with medical evidence, can you directly connect this injury or occupational disease as job incurred?    If No, is the employee capable of: Full Duty    Modified Duty      Is additional medical care by a physician indicated?    If Modified Duty, Specify any Limitations / Restrictions        Do you know of any previous injury or disease contributing to this condition or occupational  "disease?      Date  2/24/2018 Print Doctor’s Name  Guido Humphries I certify the employer’s copy of this form was mailed on:   Address  [address] [place] [address] [phone] Insurer’s Use Only   [facility] Zip  [address]    Provider’s Tax ID Number    Telephone  Dept: [phone]    Doctor’s Signature    Degree       Original - TREATING PHYSICIAN OR CHIROPRACTOR   Pg 2-Insurer/TPA   Pg 3-Employer   Pg 4-Employee                                                                                                  Form C-4 (rev01/03)     BRIEF DESCRIPTION OF RIGHTS AND BENEFITS  ([place]ursuant to [place])    Notice of Injury or Occupational Disease (Incident Report Form C-1): If an injury or occupational disease (OD) arises out of and in the course of employment, you must provide written notice to your employer as soon as practicable, but no later than 7 days after the accident or OD. Your employer shall maintain a sufficient supply of the required forms.    Claim for Compensation (Form C-4): If medical treatment is sought, the form C-4 is available at the place of initial treatment. A completed \"Claim for Compensation\" (Form C-4) must be filed within 90 days after an accident or OD. The treating physician or chiropractor must, within 3 working days after treatment, complete and mail to the employer, the employer's insurer and third-party , the Claim for Compensation.    Medical Treatment: If you require medical treatment for your on-the-job injury or OD, you may be required to select a physician or chiropractor from a list provided by your workers’ compensation insurer, if it has contracted with an Organization for Managed Care (MCO) or Preferred Provider Organization (PPO) or providers of health care. If your employer has not entered into a contract with an MCO or PPO, you may select a physician or chiropractor from the Panel of Physicians and Chiropractors. Any medical costs " related to your industrial injury or OD will be paid by your insurer.    Temporary Total Disability (TTD): If your doctor has certified that you are unable to work for a period of at least 5 consecutive days, or 5 cumulative days in a 20-day period, or places restrictions on you that your employer does not accommodate, you may be entitled to TTD compensation.    Temporary Partial Disability (TPD): If the wage you receive upon reemployment is less than the compensation for TTD to which you are entitled, the insurer may be required to pay you TPD compensation to make up the difference. TPD can only be paid for a maximum of 24 months.    Permanent Partial Disability (PPD): When your medical condition is stable and there is an indication of a PPD as a result of your injury or OD, within 30 days, your insurer must arrange for an evaluation by a rating physician or chiropractor to determine the degree of your PPD. The amount of your PPD award depends on the date of injury, the results of the PPD evaluation and your age and wage.    Permanent Total Disability (PTD): If you are medically certified by a treating physician or chiropractor as permanently and totally disabled and have been granted a PTD status by your insurer, you are entitled to receive monthly benefits not to exceed 66 2/3% of your average monthly wage. The amount of your PTD payments is subject to reduction if you previously received a PPD award.    Vocational Rehabilitation Services: You may be eligible for vocational rehabilitation services if you are unable to return to the job due to a permanent physical impairment or permanent restrictions as a result of your injury or occupational disease.    Transportation and Per Lai Reimbursement: You may be eligible for travel expenses and per lai associated with medical treatment.  Reopening: You may be able to reopen your claim if your condition worsens after claim closure.    Appeal Process: If you disagree  with a written determination issued by the insurer or the insurer does not respond to your request, you may appeal to the Department of Administration, , by following the instructions contained in your determination letter. You must appeal the determination within 70 days from the date of the determination letter at [address], [address], [place], [place] [address], or [address], [address], [place], [place] [address]. If you disagree with the  decision, you may appeal to the Department of Administration, . You must file your appeal within 30 days from the date of the  decision letter at [address], [address], [place], [place] [address], or [address], [address], [place], [place] [address]. If you disagree with a decision of an , you may file a petition for judicial review with the District Court. You must do so within 30 days of the Appeal Officer’s decision. You may be represented by an  at your own expense or you may contact the [facility] for possible representation.    [place]  for Injured Workers (NAIW): If you disagree with a  decision, you may request that NAIW represent you without charge at an  Hearing. For information regarding denial of benefits, you may contact the [facility] at: [address], [address], [place] [address], [phone], or [address], [address], [place] [address], [phone]    To File a Complaint with the Division: If you wish to file a complaint with the  of the Division of Industrial Relations (DIR), please contact the Workers’ Compensation Section, [address], [address], [place], [place] [address], telephone [phone], or [address], [address], [place], [place] [address], telephone [phone].    For assistance with Workers’ Compensation Issues: you may contact  the Office of the [employer], [address] [place] [address], [address], [place], [place] [address], Toll Free 1-725.629.7407, Web site: http://govcha.[facility].[place].us, E-mail james@[facility].nv.                                                                                                                                                                               __________________________________________________________________                                    _________________            Employee Name / Signature                                                                                                                            Date                                       D-2 (rev. 10/07)

## 2018-02-24 NOTE — ED NOTES
Discharged home in good condition with prescriptions and follow up instructions, pt verbalizes understanding of all, ambulates out with steady gait accompanied by self.  Pt instructed not to drive while taking narcotics, verbalizes understanding.  Narcotic consent signed and placed on chart.

## 2018-02-24 NOTE — ED PROVIDER NOTES
ED Provider Note    Addendum:    I filled out the opiate risk tool, I looked her up on the [place] prescription monitoring program and I discussed with her risks and benefits of the narcotics including addiction how to dispose of the medications I did take them appropriately and alternate treatments.

## 2018-02-24 NOTE — DISCHARGE INSTRUCTIONS
Arthritis, Nonspecific  Arthritis is inflammation of a joint. This usually means pain, redness, warmth or swelling are present. One or more joints may be involved. There are a number of types of arthritis. Your caregiver may not be able to tell what type of arthritis you have right away.  CAUSES   The most common cause of arthritis is the wear and tear on the joint (osteoarthritis). This causes damage to the cartilage, which can break down over time. The knees, hips, back and neck are most often affected by this type of arthritis.  Other types of arthritis and common causes of joint pain include:  · Sprains and other injuries near the joint. Sometimes minor sprains and injuries cause pain and swelling that develop hours later.  · Rheumatoid arthritis. This affects hands, feet and knees. It usually affects both sides of your body at the same time. It is often associated with chronic ailments, fever, weight loss and general weakness.  · Crystal arthritis. Gout and pseudo gout can cause occasional acute severe pain, redness and swelling in the foot, ankle, or knee.  · Infectious arthritis. Bacteria can get into a joint through a break in overlying skin. This can cause infection of the joint. Bacteria and viruses can also spread through the blood and affect your joints.  · Drug, infectious and allergy reactions. Sometimes joints can become mildly painful and slightly swollen with these types of illnesses.  SYMPTOMS   · Pain is the main symptom.  · Your joint or joints can also be red, swollen and warm or hot to the touch.  · You may have a fever with certain types of arthritis, or even feel overall ill.  · The joint with arthritis will hurt with movement. Stiffness is present with some types of arthritis.  DIAGNOSIS   Your caregiver will suspect arthritis based on your description of your symptoms and on your exam. Testing may be needed to find the type of arthritis:  · Blood and sometimes urine tests.  · X-ray tests  and sometimes CT or MRI scans.  · Removal of fluid from the joint (arthrocentesis) is done to check for bacteria, crystals or other causes. Your caregiver (or a specialist) will numb the area over the joint with a local anesthetic, and use a needle to remove joint fluid for examination. This procedure is only minimally uncomfortable.  · Even with these tests, your caregiver may not be able to tell what kind of arthritis you have. Consultation with a specialist (rheumatologist) may be helpful.  TREATMENT   Your caregiver will discuss with you treatment specific to your type of arthritis. If the specific type cannot be determined, then the following general recommendations may apply.  Treatment of severe joint pain includes:  · Rest.  · Elevation.  · Anti-inflammatory medication (for example, ibuprofen) may be prescribed. Avoiding activities that cause increased pain.  · Only take over-the-counter or prescription medicines for pain and discomfort as recommended by your caregiver.  · Cold packs over an inflamed joint may be used for 10 to 15 minutes every hour. Hot packs sometimes feel better, but do not use overnight. Do not use hot packs if you are diabetic without your caregiver's permission.  · A cortisone shot into arthritic joints may help reduce pain and swelling.  · Any acute arthritis that gets worse over the next 1 to 2 days needs to be looked at to be sure there is no joint infection.  Long-term arthritis treatment involves modifying activities and lifestyle to reduce joint stress jarring. This can include weight loss. Also, exercise is needed to nourish the joint cartilage and remove waste. This helps keep the muscles around the joint strong.  HOME CARE INSTRUCTIONS   · Do not take aspirin to relieve pain if gout is suspected. This elevates uric acid levels.  · Only take over-the-counter or prescription medicines for pain, discomfort or fever as directed by your caregiver.  · Rest the joint as much as  possible.  · If your joint is swollen, keep it elevated.  · Use crutches if the painful joint is in your leg.  · Drinking plenty of fluids may help for certain types of arthritis.  · Follow your caregiver's dietary instructions.  · Try low-impact exercise such as:  ¨ Swimming.  ¨ Water aerobics.  ¨ Biking.  ¨ Walking.  · Morning stiffness is often relieved by a warm shower.  · Put your joints through regular range-of-motion.  SEEK MEDICAL CARE IF:   · You do not feel better in 24 hours or are getting worse.  · You have side effects to medications, or are not getting better with treatment.  SEEK IMMEDIATE MEDICAL CARE IF:   · You have a fever.  · You develop severe joint pain, swelling or redness.  · Many joints are involved and become painful and swollen.  · There is severe back pain and/or leg weakness.  · You have loss of bowel or bladder control.     This information is not intended to replace advice given to you by your health care provider. Make sure you discuss any questions you have with your health care provider.     Document Released: 01/25/2006 Document Revised: 01/08/2016 Document Reviewed: 03/14/2016  [employer] Interactive Patient Education ©2016 [employer]      Chest Wall Pain  Chest wall pain is pain in or around the bones and muscles of your chest. It may take up to 6 weeks to get better. It may take longer if you must stay physically active in your work and activities.   CAUSES   Chest wall pain may happen on its own. However, it may be caused by:  · A viral illness like the flu.  · Injury.  · Coughing.  · Exercise.  · Arthritis.  · Fibromyalgia.  · Shingles.  HOME CARE INSTRUCTIONS   · Avoid overtiring physical activity. Try not to strain or perform activities that cause pain. This includes any activities using your chest or your abdominal and side muscles, especially if heavy weights are used.  · Put ice on the sore area.  ¨ Put ice in a plastic bag.  ¨ Place a towel between your skin and the  bag.  ¨ Leave the ice on for 15-20 minutes per hour while awake for the first 2 days.  · Only take over-the-counter or prescription medicines for pain, discomfort, or fever as directed by your caregiver.  SEEK IMMEDIATE MEDICAL CARE IF:   · Your pain increases, or you are very uncomfortable.  · You have a fever.  · Your chest pain becomes worse.  · You have new, unexplained symptoms.  · You have nausea or vomiting.  · You feel sweaty or lightheaded.  · You have a cough with phlegm (sputum), or you cough up blood.  MAKE SURE YOU:   · Understand these instructions.  · Will watch your condition.  · Will get help right away if you are not doing well or get worse.     This information is not intended to replace advice given to you by your health care provider. Make sure you discuss any questions you have with your health care provider.     Document Released: 12/18/2006 Document Revised: 03/11/2013 Document Reviewed: 03/14/2016  [employer] Interactive Patient Education ©2016 [employer]      Contusion  A contusion is a deep bruise. Contusions are the result of an injury that caused bleeding under the skin. The contusion may turn blue, purple, or yellow. Minor injuries will give you a painless contusion, but more severe contusions may stay painful and swollen for a few weeks.   CAUSES   A contusion is usually caused by a blow, trauma, or direct force to an area of the body.  SYMPTOMS   · Swelling and redness of the injured area.  · Bruising of the injured area.  · Tenderness and soreness of the injured area.  · Pain.  DIAGNOSIS   The diagnosis can be made by taking a history and physical exam. An X-ray, CT scan, or MRI may be needed to determine if there were any associated injuries, such as fractures.  TREATMENT   Specific treatment will depend on what area of the body was injured. In general, the best treatment for a contusion is resting, icing, elevating, and applying cold compresses to the injured area.  Over-the-counter medicines may also be recommended for pain control. Ask your caregiver what the best treatment is for your contusion.  HOME CARE INSTRUCTIONS   · Put ice on the injured area.  ¨ Put ice in a plastic bag.  ¨ Place a towel between your skin and the bag.  ¨ Leave the ice on for 15-20 minutes, 3-4 times a day, or as directed by your health care provider.  · Only take over-the-counter or prescription medicines for pain, discomfort, or fever as directed by your caregiver. Your caregiver may recommend avoiding anti-inflammatory medicines (aspirin, ibuprofen, and naproxen) for 48 hours because these medicines may increase bruising.  · Rest the injured area.  · If possible, elevate the injured area to reduce swelling.  SEEK IMMEDIATE MEDICAL CARE IF:   · You have increased bruising or swelling.  · You have pain that is getting worse.  · Your swelling or pain is not relieved with medicines.  MAKE SURE YOU:   · Understand these instructions.  · Will watch your condition.  · Will get help right away if you are not doing well or get worse.     This information is not intended to replace advice given to you by your health care provider. Make sure you discuss any questions you have with your health care provider.     Document Released: 09/27/2006 Document Revised: 12/23/2014 Document Reviewed: 10/22/2012  [employer] Interactive Patient Education ©2016 [employer]      Fall Prevention in the Home   Falls can cause injuries. They can happen to people of all ages. There are many things you can do to make your home safe and to help prevent falls.   WHAT CAN I DO ON THE OUTSIDE OF MY HOME?  · Regularly fix the edges of walkways and driveways and fix any cracks.  · Remove anything that might make you trip as you walk through a door, such as a raised step or threshold.  · Trim any bushes or trees on the path to your home.  · Use bright outdoor lighting.  · Clear any walking paths of anything that might make someone trip,  such as rocks or tools.  · Regularly check to see if handrails are loose or broken. Make sure that both sides of any steps have handrails.  · Any raised decks and porches should have guardrails on the edges.  · Have any leaves, snow, or ice cleared regularly.  · Use sand or salt on walking paths during winter.  · Clean up any spills in your garage right away. This includes oil or grease spills.  WHAT CAN I DO IN THE BATHROOM?   · Use night lights.  · Install grab bars by the toilet and in the tub and shower. Do not use towel bars as grab bars.  · Use non-skid mats or decals in the tub or shower.  · If you need to sit down in the shower, use a plastic, non-slip stool.  · Keep the floor dry. Clean up any water that spills on the floor as soon as it happens.  · Remove soap buildup in the tub or shower regularly.  · Attach bath mats securely with double-sided non-slip rug tape.  · Do not have throw rugs and other things on the floor that can make you trip.  WHAT CAN I DO IN THE BEDROOM?  · Use night lights.  · Make sure that you have a light by your bed that is easy to reach.  · Do not use any sheets or blankets that are too big for your bed. They should not hang down onto the floor.  · Have a firm chair that has side arms. You can use this for support while you get dressed.  · Do not have throw rugs and other things on the floor that can make you trip.  WHAT CAN I DO IN THE KITCHEN?  · Clean up any spills right away.  · Avoid walking on wet floors.  · Keep items that you use a lot in easy-to-reach places.  · If you need to reach something above you, use a strong step stool that has a grab bar.  · Keep electrical cords out of the way.  · Do not use floor polish or wax that makes floors slippery. If you must use wax, use non-skid floor wax.  · Do not have throw rugs and other things on the floor that can make you trip.  WHAT CAN I DO WITH MY STAIRS?  · Do not leave any items on the stairs.  · Make sure that there are  handrails on both sides of the stairs and use them. Fix handrails that are broken or loose. Make sure that handrails are as long as the stairways.  · Check any carpeting to make sure that it is firmly attached to the stairs. Fix any carpet that is loose or worn.  · Avoid having throw rugs at the top or bottom of the stairs. If you do have throw rugs, attach them to the floor with carpet tape.  · Make sure that you have a light switch at the top of the stairs and the bottom of the stairs. If you do not have them, ask someone to add them for you.  WHAT ELSE CAN I DO TO HELP PREVENT FALLS?  · Wear shoes that:  ¨ Do not have high heels.  ¨ Have rubber bottoms.  ¨ Are comfortable and fit you well.  ¨ Are closed at the toe. Do not wear sandals.  · If you use a stepladder:  ¨ Make sure that it is fully opened. Do not climb a closed stepladder.  ¨ Make sure that both sides of the stepladder are locked into place.  ¨ Ask someone to hold it for you, if possible.  · Clearly preston and make sure that you can see:  ¨ Any grab bars or handrails.  ¨ First and last steps.  ¨ Where the edge of each step is.  · Use tools that help you move around (mobility aids) if they are needed. These include:  ¨ Canes.  ¨ Walkers.  ¨ Scooters.  ¨ Crutches.  · Turn on the lights when you go into a dark area. Replace any light bulbs as soon as they burn out.  · Set up your furniture so you have a clear path. Avoid moving your furniture around.  · If any of your floors are uneven, fix them.  · If there are any pets around you, be aware of where they are.  · Review your medicines with your doctor. Some medicines can make you feel dizzy. This can increase your chance of falling.  Ask your doctor what other things that you can do to help prevent falls.     This information is not intended to replace advice given to you by your health care provider. Make sure you discuss any questions you have with your health care provider.     Document Released:  10/14/2010 Document Revised: 05/03/2016 Document Reviewed: 01/22/2016  [employer] Interactive Patient Education ©2016 [employer]

## 2018-02-24 NOTE — ED NOTES
Pt was leaving work and there was uneven part in the sidewalk. She tripped and fell, hit both knees, hit her R side of her face and R hip. Pt states when she fell she felt like there was a vice  around her chest. EKG being done. Denies LOC

## 2018-02-24 NOTE — LETTER
"  FORM C-4:  EMPLOYEE’S CLAIM FOR COMPENSATION/ REPORT OF INITIAL TREATMENT  EMPLOYEE’S CLAIM - PROVIDE ALL INFORMATION REQUESTED   First Name  Charlene Last Name  Pako Birthdate             Age  1960 57 y.o. 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 (if applicable)  Walking to Car Lot    How did this injury or occupational disease occur? Be specific and answer in detail. 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   If you believe that you have an occupational disease, when did you first have knowledge of the disability and it relationship to your employment?  N/A Witnesses to the Accident  Co-Workers     Nature of Injury or Occupational Disease  Workers' Compensation  Part(s) of Body Injured or Affected  Hip (R), Knee (L), Skull    I certify that " the above is true and correct to the best of my knowledge and that I have provided this information in order to obtain the benefits of [place]  I hereby authorize any physician, chiropractor, surgeon, practitioner, or other person, any hospital, including [facility] or [facility] hospital, any medical service organization, any insurance company, or other institution or organization to release to each other, any medical or other information, including benefits paid or payable, pertinent to this injury or disease, except information relative to diagnosis, treatment and/or counseling for AIDS, psychological conditions, alcohol or controlled substances, for which I must give specific authorization.  A Photostat of this authorization shall be as valid as the original.   Date Place   Employee’s Signature   THIS REPORT MUST BE COMPLETED AND MAILED WITHIN 3 WORKING DAYS OF TREATMENT   Place  [facility], EMERGENCY DEPT  Name of Facility   [facility]   Date  2/24/2018 Diagnosis  No diagnosis found. Is there evidence the injured employee was under the influence of alcohol and/or another controlled substance at the time of accident?   Hour  10:03 AM Description of Injury or Disease       Treatment     Have you advised the patient to remain off work five days or more?             X-Ray Findings      If Yes   From Date    To Date      From information given by the employee, together with medical evidence, can you directly connect this injury or occupational disease as job incurred?    If No, is the employee capable of: Full Duty    Modified Duty      Is additional medical care by a physician indicated?    If Modified Duty, Specify any Limitations / Restrictions        Do you know of any previous injury or disease contributing to this condition or occupational  "disease?      Date  2/24/2018 Print Doctor’s Name  Guido Humphries I certify the employer’s copy of this form was mailed on:   Address  [address] [place] [address] [phone] Insurer’s Use Only   [facility] Zip  [address]    Provider’s Tax ID Number    Telephone  Dept: [phone]    Doctor’s Signature    Degree       Original - TREATING PHYSICIAN OR CHIROPRACTOR   Pg 2-Insurer/TPA   Pg 3-Employer   Pg 4-Employee                                                                                                  Form C-4 (rev01/03)     BRIEF DESCRIPTION OF RIGHTS AND BENEFITS  (Pursuant to [place]RS 616C.050)    Notice of Injury or Occupational Disease (Incident Report Form C-1): If an injury or occupational disease (OD) arises out of and in the course of employment, you must provide written notice to your employer as soon as practicable, but no later than 7 days after the accident or OD. Your employer shall maintain a sufficient supply of the required forms.    Claim for Compensation (Form C-4): If medical treatment is sought, the form C-4 is available at the place of initial treatment. A completed \"Claim for Compensation\" (Form C-4) must be filed within 90 days after an accident or OD. The treating physician or chiropractor must, within 3 working days after treatment, complete and mail to the employer, the employer's insurer and third-party , the Claim for Compensation.    Medical Treatment: If you require medical treatment for your on-the-job injury or OD, you may be required to select a physician or chiropractor from a list provided by your workers’ compensation insurer, if it has contracted with an Organization for Managed Care (MCO) or Preferred Provider Organization (PPO) or providers of health care. If your employer has not entered into a contract with an MCO or PPO, you may select a physician or chiropractor from the Panel of Physicians and Chiropractors. Any medical costs " related to your industrial injury or OD will be paid by your insurer.    Temporary Total Disability (TTD): If your doctor has certified that you are unable to work for a period of at least 5 consecutive days, or 5 cumulative days in a 20-day period, or places restrictions on you that your employer does not accommodate, you may be entitled to TTD compensation.    Temporary Partial Disability (TPD): If the wage you receive upon reemployment is less than the compensation for TTD to which you are entitled, the insurer may be required to pay you TPD compensation to make up the difference. TPD can only be paid for a maximum of 24 months.    Permanent Partial Disability (PPD): When your medical condition is stable and there is an indication of a PPD as a result of your injury or OD, within 30 days, your insurer must arrange for an evaluation by a rating physician or chiropractor to determine the degree of your PPD. The amount of your PPD award depends on the date of injury, the results of the PPD evaluation and your age and wage.    Permanent Total Disability (PTD): If you are medically certified by a treating physician or chiropractor as permanently and totally disabled and have been granted a PTD status by your insurer, you are entitled to receive monthly benefits not to exceed 66 2/3% of your average monthly wage. The amount of your PTD payments is subject to reduction if you previously received a PPD award.    Vocational Rehabilitation Services: You may be eligible for vocational rehabilitation services if you are unable to return to the job due to a permanent physical impairment or permanent restrictions as a result of your injury or occupational disease.    Transportation and Per Lai Reimbursement: You may be eligible for travel expenses and per lai associated with medical treatment.  Reopening: You may be able to reopen your claim if your condition worsens after claim closure.    Appeal Process: If you disagree  with a written determination issued by the insurer or the insurer does not respond to your request, you may appeal to the Department of Administration, , by following the instructions contained in your determination letter. You must appeal the determination within 70 days from the date of the determination letter at [address], [address], [place], [place] [address], or [address], [address], [place], [place] [address]. If you disagree with the  decision, you may appeal to the Department of Administration, . You must file your appeal within 30 days from the date of the  decision letter at [address], [address], [place], [place] [address], or [address], [address], [place], [place] [address]. If you disagree with a decision of an , you may file a petition for judicial review with the District Court. You must do so within 30 days of the Appeal Officer’s decision. You may be represented by an  at your own expense or you may contact the [facility] for possible representation.    [place]  for Injured Workers (NAIW): If you disagree with a  decision, you may request that NAIW represent you without charge at an  Hearing. For information regarding denial of benefits, you may contact the [facility] at: [address], [address], [place] [address], [phone], or [address], [place] [address], [phone]    To File a Complaint with the Division: If you wish to file a complaint with the  of the Division of Industrial Relations (DIR), please contact the Workers’ Compensation Section, [address], [address], [place], [place] [address], telephone (822) 090-2406, or [address], [address], [place], [place] [address], telephone [phone].    For assistance with Workers’ Compensation Issues: you may contact  the Office of the [facility] Consumer Health Assistance, [address] [place] [address], [address], [place], [place] [address], Toll Free 1-474.530.1529, Web site: http://govcha.[facility].[place].us, E-mail [email or web address].                                                                                                                                                                               __________________________________________________________________                                    _________________            Employee Name / Signature                                                                                                                            Date                                       D-2 (rev. 10/07)

## 2018-02-24 NOTE — LETTER
"  FORM C-4:  EMPLOYEE’S CLAIM FOR COMPENSATION/ REPORT OF INITIAL TREATMENT  EMPLOYEE’S CLAIM - PROVIDE ALL INFORMATION REQUESTED   First Name  Charlene Last Name  Pako Birthdate             Age  1960 57 y.o. 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 (if applicable)  Walking to Car Lot    How did this injury or occupational disease occur? Be specific and answer in detail. 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   If you believe that you have an occupational disease, when did you first have knowledge of the disability and it relationship to your employment?  N/A Witnesses to the Accident  Co-Workers     Nature of Injury or Occupational Disease  Workers' Compensation  Part(s) of Body Injured or Affected  Hip (R), Knee (L), Skull    I certify that " the above is true and correct to the best of my knowledge and that I have provided this information in order to obtain the benefits of [place], inclusive or Chapter 617 of [place]RS).  I hereby authorize any physician, chiropractor, surgeon, practitioner, or other person, any hospital, including [facility] or [facility] hospital, any medical service organization, any insurance company, or other institution or organization to release to each other, any medical or other information, including benefits paid or payable, pertinent to this injury or disease, except information relative to diagnosis, treatment and/or counseling for AIDS, psychological conditions, alcohol or controlled substances, for which I must give specific authorization.  A Photostat of this authorization shall be as valid as the original.   Date Place   Employee’s Signature   THIS REPORT MUST BE COMPLETED AND MAILED WITHIN 3 WORKING DAYS OF TREATMENT   Place  [facility] [place], EMERGENCY DEPT  Name of Facility   [facility] [place]   Date  2/24/2018 Diagnosis  No diagnosis found. Is there evidence the injured employee was under the influence of alcohol and/or another controlled substance at the time of accident?   Hour  10:11 AM Description of Injury or Disease       Treatment     Have you advised the patient to remain off work five days or more?             X-Ray Findings      If Yes   From Date    To Date      From information given by the employee, together with medical evidence, can you directly connect this injury or occupational disease as job incurred?    If No, is the employee capable of: Full Duty    Modified Duty      Is additional medical care by a physician indicated?    If Modified Duty, Specify any Limitations / Restrictions        Do you know of any previous injury or disease contributing to this condition or occupational  "disease?      Date  2/24/2018 Print Doctor’s Name  Guido Humphries I certify the employer’s copy of this form was mailed on:   Address  [address] [place] [address] [phone] Insurer’s Use Only   [facility] Zip  [address]    Provider’s Tax ID Number    Telephone  Dept: [phone]    Doctor’s Signature    Degree       Original - TREATING PHYSICIAN OR CHIROPRACTOR   Pg 2-Insurer/TPA   Pg 3-Employer   Pg 4-Employee                                                                                                  Form C-4 (rev01/03)     BRIEF DESCRIPTION OF RIGHTS AND BENEFITS  ([place]ursuant to [place] 616C.050)    Notice of Injury or Occupational Disease (Incident Report Form C-1): If an injury or occupational disease (OD) arises out of and in the course of employment, you must provide written notice to your employer as soon as practicable, but no later than 7 days after the accident or OD. Your employer shall maintain a sufficient supply of the required forms.    Claim for Compensation (Form C-4): If medical treatment is sought, the form C-4 is available at the place of initial treatment. A completed \"Claim for Compensation\" (Form C-4) must be filed within 90 days after an accident or OD. The treating physician or chiropractor must, within 3 working days after treatment, complete and mail to the employer, the employer's insurer and third-party , the Claim for Compensation.    Medical Treatment: If you require medical treatment for your on-the-job injury or OD, you may be required to select a physician or chiropractor from a list provided by your workers’ compensation insurer, if it has contracted with an Organization for Managed Care (MCO) or Preferred Provider Organization (PPO) or providers of health care. If your employer has not entered into a contract with an MCO or PPO, you may select a physician or chiropractor from the Panel of Physicians and Chiropractors. Any medical costs " related to your industrial injury or OD will be paid by your insurer.    Temporary Total Disability (TTD): If your doctor has certified that you are unable to work for a period of at least 5 consecutive days, or 5 cumulative days in a 20-day period, or places restrictions on you that your employer does not accommodate, you may be entitled to TTD compensation.    Temporary Partial Disability (TPD): If the wage you receive upon reemployment is less than the compensation for TTD to which you are entitled, the insurer may be required to pay you TPD compensation to make up the difference. TPD can only be paid for a maximum of 24 months.    Permanent Partial Disability (PPD): When your medical condition is stable and there is an indication of a PPD as a result of your injury or OD, within 30 days, your insurer must arrange for an evaluation by a rating physician or chiropractor to determine the degree of your PPD. The amount of your PPD award depends on the date of injury, the results of the PPD evaluation and your age and wage.    Permanent Total Disability (PTD): If you are medically certified by a treating physician or chiropractor as permanently and totally disabled and have been granted a PTD status by your insurer, you are entitled to receive monthly benefits not to exceed 66 2/3% of your average monthly wage. The amount of your PTD payments is subject to reduction if you previously received a PPD award.    Vocational Rehabilitation Services: You may be eligible for vocational rehabilitation services if you are unable to return to the job due to a permanent physical impairment or permanent restrictions as a result of your injury or occupational disease.    Transportation and Per Lai Reimbursement: You may be eligible for travel expenses and per lai associated with medical treatment.  Reopening: You may be able to reopen your claim if your condition worsens after claim closure.    Appeal Process: If you disagree  with a written determination issued by the insurer or the insurer does not respond to your request, you may appeal to the Department of Administration, , by following the instructions contained in your determination letter. You must appeal the determination within 70 days from the date of the determination letter at [address], [address], [place], [place] [address], or [address], [address], [place], [place] [address]. If you disagree with the  decision, you may appeal to the Department of Administration, . You must file your appeal within 30 days from the date of the  decision letter at [address], [address], [place], [place] [address], or [address], [address], [place], [place] [address]. If you disagree with a decision of an , you may file a petition for judicial review with the District Court. You must do so within 30 days of the Appeal Officer’s decision. You may be represented by an  at your own expense or you may contact the [facility] for possible representation.    [place]  for Injured Workers ([place]IW): If you disagree with a  decision, you may request that NAIW represent you without charge at an  Hearing. For information regarding denial of benefits, you may contact the [facility] at: [address], [address], [place] [address], [phone]) [phone], or [address], [place] [address], [phone]    To File a Complaint with the Division: If you wish to file a complaint with the  of the Division of Industrial Relations (DIR), please contact the Workers’ Compensation Section, [address], [address], [place], [place] [address], telephone ([phone]) [phone], or [address], [address], [place], [place] [address], telephone [phone].    For assistance with Workers’ Compensation Issues: you may contact  the Office of the [facility] Consumer Health Assistance, [address] [place] [address], [address], [place], [place] [address], Toll Free 1-153.976.6729, Web site: http://govcha.[facility].[place].us, E-mail [email or web address]                                                                                                                                                                               __________________________________________________________________                                    _________________            Employee Name / Signature                                                                                                                            Date                                       D-2 (rev. 10/07)

## 2018-02-24 NOTE — ED NOTES
Med Rec completed per patient  Allergies reviewed  No ORAL antibiotics in last 30 days    Patient had some left over Cipro and Flagyl at home and took for 3 days a couple weeks ago for diverticulitis

## 2018-02-24 NOTE — LETTER
"  FORM C-4:  EMPLOYEE’S CLAIM FOR COMPENSATION/ REPORT OF INITIAL TREATMENT  EMPLOYEE’S CLAIM - PROVIDE ALL INFORMATION REQUESTED   First Name  Charlene Last Name  Pako Birthdate             Age  1960 57 y.o. 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 (if applicable)  Walking to Car Lot    How did this injury or occupational disease occur? Be specific and answer in detail. 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   If you believe that you have an occupational disease, when did you first have knowledge of the disability and it relationship to your employment?  N/A Witnesses to the Accident  Co-Workers     Nature of Injury or Occupational Disease  Workers' Compensation  Part(s) of Body Injured or Affected  Hip (R), Knee (L), Skull    I certify that " the above is true and correct to the best of my knowledge and that I have provided this information in order to obtain the benefits of [place] inclusive or Chapter 617 of [place]  I hereby authorize any physician, chiropractor, surgeon, practitioner, or other person, any hospital, including [facility] or [facility], any medical service organization, any insurance company, or other institution or organization to release to each other, any medical or other information, including benefits paid or payable, pertinent to this injury or disease, except information relative to diagnosis, treatment and/or counseling for AIDS, psychological conditions, alcohol or controlled substances, for which I must give specific authorization.  A Photostat of this authorization shall be as valid as the original.   Date Place   Employee’s Signature   THIS REPORT MUST BE COMPLETED AND MAILED WITHIN 3 WORKING DAYS OF TREATMENT   Place  [facility], EMERGENCY DEPT  Name of Facility   [facility]   Date  2/24/2018 Diagnosis  No diagnosis found. Is there evidence the injured employee was under the influence of alcohol and/or another controlled substance at the time of accident?   Hour  8:56 AM Description of Injury or Disease       Treatment     Have you advised the patient to remain off work five days or more?             X-Ray Findings      If Yes   From Date    To Date      From information given by the employee, together with medical evidence, can you directly connect this injury or occupational disease as job incurred?    If No, is the employee capable of: Full Duty    Modified Duty      Is additional medical care by a physician indicated?    If Modified Duty, Specify any Limitations / Restrictions        Do you know of any previous injury or disease contributing to this condition or occupational  "disease?      Date  2/24/2018 Print Doctor’s Name  Guido Humphries I certify the employer’s copy of this form was mailed on:   Address  [address] [place] [address] [phone] Insurer’s Use Only   [facility] Zip  [address]    Provider’s Tax ID Number    Telephone  Dept: [phone]    Doctor’s Signature    Degree       Original - TREATING PHYSICIAN OR CHIROPRACTOR   Pg 2-Insurer/TPA   Pg 3-Employer   Pg 4-Employee                                                                                                  Form C-4 (rev01/03)     BRIEF DESCRIPTION OF RIGHTS AND BENEFITS  (Pursuant to [place]RS 616C.050)    Notice of Injury or Occupational Disease (Incident Report Form C-1): If an injury or occupational disease (OD) arises out of and in the course of employment, you must provide written notice to your employer as soon as practicable, but no later than 7 days after the accident or OD. Your employer shall maintain a sufficient supply of the required forms.    Claim for Compensation (Form C-4): If medical treatment is sought, the form C-4 is available at the place of initial treatment. A completed \"Claim for Compensation\" (Form C-4) must be filed within 90 days after an accident or OD. The treating physician or chiropractor must, within 3 working days after treatment, complete and mail to the employer, the employer's insurer and third-party , the Claim for Compensation.    Medical Treatment: If you require medical treatment for your on-the-job injury or OD, you may be required to select a physician or chiropractor from a list provided by your workers’ compensation insurer, if it has contracted with an Organization for Managed Care (MCO) or Preferred Provider Organization (PPO) or providers of health care. If your employer has not entered into a contract with an MCO or PPO, you may select a physician or chiropractor from the Panel of Physicians and Chiropractors. Any medical costs " related to your industrial injury or OD will be paid by your insurer.    Temporary Total Disability (TTD): If your doctor has certified that you are unable to work for a period of at least 5 consecutive days, or 5 cumulative days in a 20-day period, or places restrictions on you that your employer does not accommodate, you may be entitled to TTD compensation.    Temporary Partial Disability (TPD): If the wage you receive upon reemployment is less than the compensation for TTD to which you are entitled, the insurer may be required to pay you TPD compensation to make up the difference. TPD can only be paid for a maximum of 24 months.    Permanent Partial Disability (PPD): When your medical condition is stable and there is an indication of a PPD as a result of your injury or OD, within 30 days, your insurer must arrange for an evaluation by a rating physician or chiropractor to determine the degree of your PPD. The amount of your PPD award depends on the date of injury, the results of the PPD evaluation and your age and wage.    Permanent Total Disability (PTD): If you are medically certified by a treating physician or chiropractor as permanently and totally disabled and have been granted a PTD status by your insurer, you are entitled to receive monthly benefits not to exceed 66 2/3% of your average monthly wage. The amount of your PTD payments is subject to reduction if you previously received a PPD award.    Vocational Rehabilitation Services: You may be eligible for vocational rehabilitation services if you are unable to return to the job due to a permanent physical impairment or permanent restrictions as a result of your injury or occupational disease.    Transportation and Per Lai Reimbursement: You may be eligible for travel expenses and per lai associated with medical treatment.  Reopening: You may be able to reopen your claim if your condition worsens after claim closure.    Appeal Process: If you disagree  with a written determination issued by the insurer or the insurer does not respond to your request, you may appeal to the Department of Administration, , by following the instructions contained in your determination letter. You must appeal the determination within 70 days from the date of the determination letter at [address], [address], [place], [place] [address], or [address], [address], [place], [place] [address]. If you disagree with the  decision, you may appeal to the Department of Administration, . You must file your appeal within 30 days from the date of the  decision letter at [address], [address], [place], [place] [address], or [address], [address], [place], [place] [address]. If you disagree with a decision of an , you may file a petition for judicial review with the District Court. You must do so within 30 days of the Appeal Officer’s decision. You may be represented by an  at your own expense or you may contact the [facility] for possible representation.    [place]  for Injured Workers (NAIW): If you disagree with a  decision, you may request that NAIW represent you without charge at an  Hearing. For information regarding denial of benefits, you may contact the [facility] at: [address], [address], [place] [address], [phone], or [address], [place] [address], [phone]    To File a Complaint with the Division: If you wish to file a complaint with the  of the Division of Industrial Relations (DIR), please contact the Workers’ Compensation Section, [address], [address], [place], [place] [address], telephone [phone], or [address], [address], [place], [place] [address], telephone [phone].    For assistance with Workers’ Compensation Issues: you may contact  the Office of the [facility] Consumer Health Assistance, [address] [place] [address], [address], [place], [place] [address], Toll Free 1-927.351.4234, Web site: http://govcha.[facility].nv.us, E-mail [email or web address].                                                                                                                                                                               __________________________________________________________________                                    _________________            Employee Name / Signature                                                                                                                            Date                                       D-2 (rev. 10/07)

## 2018-02-24 NOTE — LETTER
"  FORM C-4:  EMPLOYEE’S CLAIM FOR COMPENSATION/ REPORT OF INITIAL TREATMENT  EMPLOYEE’S CLAIM - PROVIDE ALL INFORMATION REQUESTED   First Name  Charlene Last Name  Pako Birthdate             Age  1960 57 y.o. 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 (if applicable)  Walking to Car Lot    How did this injury or occupational disease occur? Be specific and answer in detail. 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   If you believe that you have an occupational disease, when did you first have knowledge of the disability and it relationship to your employment?  N/A Witnesses to the Accident  Co-Workers     Nature of Injury or Occupational Disease  Workers' Compensation  Part(s) of Body Injured or Affected  Hip (R), Knee (L), Skull    I certify that " the above is true and correct to the best of my knowledge and that I have provided this information in order to obtain the benefits of [place]’s [place] 616A [place], inclusive or Chapter 617 of [place]RS).  I hereby authorize any physician, chiropractor, surgeon, practitioner, or other person, any hospital, including [facility] hospital, any medical service organization, any insurance company, or other institution or organization to release to each other, any medical or other information, including benefits paid or payable, pertinent to this injury or disease, except information relative to diagnosis, treatment and/or counseling for AIDS, psychological conditions, alcohol or controlled substances, for which I must give specific authorization.  A Photostat of this authorization shall be as valid as the original.   Date Place   Employee’s Signature   THIS REPORT MUST BE COMPLETED AND MAILED WITHIN 3 WORKING DAYS OF TREATMENT   Place  [facility], EMERGENCY DEPT  Name of Facility   [facility]   Date  2/24/2018 Diagnosis  (S80.00XA) Contusion of knee, unspecified laterality, initial encounter  (primary encounter diagnosis)  (W19.XXXA) Fall, initial encounter  (M25.552) Hip pain, acute, left  (R07.81) Pleuritic chest pain  (S50.11XA) Contusion of right forearm, initial encounter Is there evidence the injured employee was under the influence of alcohol and/or another controlled substance at the time of accident?   Hour  10:50 AM Description of Injury or Disease  Fall, initial encounter  Hip pain, acute, left  Pleuritic chest pain  Contusion of right forearm, initial encounter  Contusion of knee, unspecified laterality, initial encounter No   Treatment  Exam and x rays  Have you advised the patient to remain off work five days or more?         No   X-Ray Findings  Negative   If Yes   From Date    To Date    "   From information given by the employee, together with medical evidence, can you directly connect this injury or occupational disease as job incurred?  Yes If No, is the employee capable of: Full Duty  No Modified Duty      Is additional medical care by a physician indicated?  Yes  Comments:occ care If Modified Duty, Specify any Limitations / Restrictions  Off work for 3 days     Do you know of any previous injury or disease contributing to this condition or occupational disease?  No   Date  2/24/2018 Print Doctor’s Name  Guido Humphries certify the employer’s copy of this form was mailed on:   Address  [address]  [place] [id number] [phone] Insurer’s Use Only   [facility]  [address]    Provider’s Tax ID Number    Telephone  Dept: [phone]    Doctor’s Signature  e-GUIDO Patel M.D. Degree       Original - TREATING PHYSICIAN OR CHIROPRACTOR   Pg 2-Insurer/TPA   Pg 3-Employer   Pg 4-Employee                                                                                                  Form C-4 (rev01/03)     BRIEF DESCRIPTION OF RIGHTS AND BENEFITS  ([place]ursuant to [place]RS 616C.050)    Notice of Injury or Occupational Disease (Incident Report Form C-1): If an injury or occupational disease (OD) arises out of and in the course of employment, you must provide written notice to your employer as soon as practicable, but no later than 7 days after the accident or OD. Your employer shall maintain a sufficient supply of the required forms.    Claim for Compensation (Form C-4): If medical treatment is sought, the form C-4 is available at the place of initial treatment. A completed \"Claim for Compensation\" (Form C-4) must be filed within 90 days after an accident or OD. The treating physician or chiropractor must, within 3 working days after treatment, complete and mail to the employer, the employer's insurer and third-party , the Claim for Compensation.    Medical " Treatment: If you require medical treatment for your on-the-job injury or OD, you may be required to select a physician or chiropractor from a list provided by your workers’ compensation insurer, if it has contracted with an Organization for Managed Care (MCO) or Preferred Provider Organization (PPO) or providers of health care. If your employer has not entered into a contract with an MCO or PPO, you may select a physician or chiropractor from the Panel of Physicians and Chiropractors. Any medical costs related to your industrial injury or OD will be paid by your insurer.    Temporary Total Disability (TTD): If your doctor has certified that you are unable to work for a period of at least 5 consecutive days, or 5 cumulative days in a 20-day period, or places restrictions on you that your employer does not accommodate, you may be entitled to TTD compensation.    Temporary Partial Disability (TPD): If the wage you receive upon reemployment is less than the compensation for TTD to which you are entitled, the insurer may be required to pay you TPD compensation to make up the difference. TPD can only be paid for a maximum of 24 months.    Permanent Partial Disability (PPD): When your medical condition is stable and there is an indication of a PPD as a result of your injury or OD, within 30 days, your insurer must arrange for an evaluation by a rating physician or chiropractor to determine the degree of your PPD. The amount of your PPD award depends on the date of injury, the results of the PPD evaluation and your age and wage.    Permanent Total Disability (PTD): If you are medically certified by a treating physician or chiropractor as permanently and totally disabled and have been granted a PTD status by your insurer, you are entitled to receive monthly benefits not to exceed 66 2/3% of your average monthly wage. The amount of your PTD payments is subject to reduction if you previously received a PPD  award.    Vocational Rehabilitation Services: You may be eligible for vocational rehabilitation services if you are unable to return to the job due to a permanent physical impairment or permanent restrictions as a result of your injury or occupational disease.    Transportation and Per Lai Reimbursement: You may be eligible for travel expenses and per lai associated with medical treatment.  Reopening: You may be able to reopen your claim if your condition worsens after claim closure.    Appeal Process: If you disagree with a written determination issued by the insurer or the insurer does not respond to your request, you may appeal to the Department of Administration, , by following the instructions contained in your determination letter. You must appeal the determination within 70 days from the date of the determination letter at [address], [address], [place], [place] [address], or [address], [place], [place] [address]. If you disagree with the  decision, you may appeal to the Department of Administration, . You must file your appeal within 30 days from the date of the  decision letter at [address], [address], [place], [place] [address], or [address], [place], [place] [address]. If you disagree with a decision of an , you may file a petition for judicial review with the District Court. You must do so within 30 days of the Appeal Officer’s decision. You may be represented by an  at your own expense or you may contact the [facility] for possible representation.    [place]  for Injured Workers (NAIW): If you disagree with a  decision, you may request that NAIW represent you without charge at an  Hearing. For information regarding denial of benefits, you may contact the [facility] at: [address], [address], [place] [address], [phone]  [phone], or [address], [address], [place] [address], [phone]    To File a Complaint with the Division: If you wish to file a complaint with the  of the Division of Industrial Relations (DIR), please contact the Workers’ Compensation Section, [address], [address], [place], [place] [address], telephone [phone]) [phone], or [address], [address], [place], [place] [address], telephone [phone].    For assistance with Workers’ Compensation Issues: you may contact the Office of the Governor Consumer Health Assistance, [address] [place] [address], [address], [place], [place] [address], Toll Free 1-324.380.3733, Web site: http://govcha.[facility].nv., E-mail [email or web address].                                                                                                                                                                               __________________________________________________________________                                    _________________            Employee Name / Signature                                                                                                                            Date                                       D-2 (rev. 10/07)

## 2018-02-28 ENCOUNTER — OCCUPATIONAL MEDICINE (OUTPATIENT)
Dept: URGENT CARE | Facility: CLINIC | Age: 58
End: 2018-02-28
Payer: COMMERCIAL

## 2018-02-28 VITALS
SYSTOLIC BLOOD PRESSURE: 136 MMHG | WEIGHT: 245 LBS | TEMPERATURE: 97.8 F | HEIGHT: 65 IN | HEART RATE: 80 BPM | DIASTOLIC BLOOD PRESSURE: 78 MMHG | BODY MASS INDEX: 40.82 KG/M2 | OXYGEN SATURATION: 96 %
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
## 2018-02-28 DIAGNOSIS — S80.02XD CONTUSION OF LEFT KNEE, SUBSEQUENT ENCOUNTER: ICD-10-CM

## 2018-02-28 DIAGNOSIS — S80.01XD CONTUSION OF RIGHT KNEE, SUBSEQUENT ENCOUNTER: ICD-10-CM

## 2018-02-28 DIAGNOSIS — S50.11XD CONTUSION OF RIGHT FOREARM, SUBSEQUENT ENCOUNTER: Primary | ICD-10-CM

## 2018-02-28 LAB
AMP AMPHETAMINE: NORMAL
BAR BARBITURATES: NORMAL
BREATH ALCOHOL COMMENT: NORMAL
BZO BENZODIAZEPINES: NORMAL
COC COCAINE: NORMAL
INT CON NEG: NORMAL
INT CON POS: NORMAL
MDMA ECSTASY: NORMAL
MET METHAMPHETAMINES: NORMAL
MTD METHADONE: NORMAL
OPI OPIATES: NORMAL
OXY OXYCODONE: NORMAL
PCP PHENCYCLIDINE: NORMAL
POC BREATHALIZER: 0 PERCENT (ref 0–0.01)
POC URINE DRUG SCREEN OCDRS: NEGATIVE
THC: NORMAL

## 2018-02-28 PROCEDURE — 99213 OFFICE O/P EST LOW 20 MIN: CPT | Mod: 29 | Performed by: PHYSICIAN ASSISTANT

## 2018-02-28 NOTE — PROGRESS NOTES
maryann was called out after business hours to [facility] for a post accident UDS and BAT on 2/24/18 for [employer].    UDS collected at 9:41 am.  BAT collected at 9:28 am.

## 2018-02-28 NOTE — PROGRESS NOTES
Subjective:      PT is a 57 y.o. female who presents with Letter for School/Work (Workers Comp [employer], Work Release)      DOI:  around 7 am  PT was seen initially at the ER 4 days ago. Now, returns for follow up visit noting all symptoms have resolved. PT restates she fell on icy, uneven sidewalk on work property. She states she fell onto a bar landscaping injuring her right forearm and B/L knees. Imagine at the ER and diagnostic testing revealed only contusions. PT notes pain is resolved but initially had right forearm pain right hip pain bilateral knee pain. Abrasions to her right cheek and right knee and shin. She denies headache neck pain or facial pain weakness or numbness. Although she states that she struck the right side of her face currently does not have pain there but some swelling. Moving around makes her arm and hip more painful. Pt has not taken any Rx medications for this condition. Pt states the pain is a 0-1/10, aching in nature and worse at night. Pt denies CP, SOB, NVD, paresthesias, headaches, dizziness, change in vision, hives, or other joint pain. The pt's medication list, problem list, and allergies have been evaluated and reviewed during today's visit.   PT denies second job.    HPI  PMH:  Past Medical History:   Diagnosis Date   • Hypertension        PSH:  Past Surgical History:   Procedure Laterality Date   • ABDOMINAL HYSTERECTOMY TOTAL         Fam Hx:    family history includes Cancer in her brother; Heart Disease in her mother; Hypertension in her mother; Lung Disease in her father.  Family Status   Relation Status   • Mother    • Father    • Brother Alive   • Brother Alive   • Brother Alive   • Brother        Soc HX:  Social History     Social History   • Marital status: Single     Spouse name: N/A   • Number of children: N/A   • Years of education: N/A     Occupational History   • Not on file.     Social History Main Topics   • Smoking status: Current Some  "Day Smoker     Types: Cigarettes   • Smokeless tobacco: Never Used   • Alcohol use No   • Drug use: No   • Sexual activity: Not on file     Other Topics Concern   • Not on file     Social History Narrative   • No narrative on file         Medications:    Current Outpatient Prescriptions:   •  HYDROcodone-acetaminophen (NORCO) 5-325 MG Tab per tablet, Take 1-2 Tabs by mouth every four hours as needed for up to 5 days., Disp: 10 Tab, Rfl: 0  •  buPROPion (WELLBUTRIN XL) 300 MG XL tablet, Take 1 Tab by mouth every morning., Disp: 30 Tab, Rfl: 11  •  metoprolol-hydrochlorothiazide (LOPRESSOR HCT) 50-25 MG per tablet, Take 1 Tab by mouth every day. (Patient taking differently: Take 0.5 Tabs by mouth every day.), Disp: 30 Tab, Rfl: 5      Allergies:  Pcn [penicillins]    ROS    Constitutional: Negative for fever, chills and malaise/fatigue.   HENT: Negative for congestion and sore throat.    Eyes: Negative for blurred vision, double vision and photophobia.   Respiratory: Negative for cough and shortness of breath.    Cardiovascular: Negative for chest pain and palpitations.   Gastrointestinal: Negative for heartburn, nausea, vomiting, abdominal pain, diarrhea and constipation.   Genitourinary: Negative for dysuria and flank pain.   Musculoskeletal: Fall causing B/L knee and right forearm joint pain and myalgias.   Skin: Negative for itching and rash.   Neurological: Negative for dizziness, tingling and headaches.   Endo/Heme/Allergies: Does not bruise/bleed easily.   Psychiatric/Behavioral: Negative for depression. The patient is not nervous/anxious.         Objective:     /78   Pulse 80   Temp 36.6 °C (97.8 °F)   Ht 1.651 m (5' 5\")   Wt 111.1 kg (245 lb)   SpO2 96%   BMI 40.77 kg/m²      Physical Exam    Right forearm: Upon inspection, mild ecchymoses, no edema, no erythema. +TTP, +AROM, +SILT, STR 5/5, DP 2+ B/L   B/L knees: Upon inspection, mild ecchymoses, no edema, no erythema. +TTP, +AROM, +SILT, STR " 5/5, DP 2+ B/L     Constitutional: PT is oriented to person, place, and time. PT appears well-developed and well-nourished. No distress.   HENT:   Head: Normocephalic and atraumatic.   Mouth/Throat: Oropharynx is clear and moist. No oropharyngeal exudate.   Eyes: Conjunctivae normal and EOM are normal. Pupils are equal, round, and reactive to light.   Neck: Normal range of motion. Neck supple. No thyromegaly present.   Cardiovascular: Normal rate, regular rhythm, normal heart sounds and intact distal pulses.  Exam reveals no gallop and no friction rub.    No murmur heard.  Pulmonary/Chest: Effort normal and breath sounds normal. No respiratory distress. PT has no wheezes. PT has no rales. Pt exhibits no tenderness.   Abdominal: Soft. Bowel sounds are normal. PT exhibits no distension and no mass. There is no tenderness. There is no rebound and no guarding.   Neurological: PT is alert and oriented to person, place, and time. PT has normal reflexes. No cranial nerve deficit.   Skin: Skin is warm and dry. No rash noted. PT is not diaphoretic. No erythema.       Psychiatric: PT has a normal mood and affect. PT behavior is normal. Judgment and thought content normal.        Assessment/Plan:     1. Contusion of right forearm, subsequent encounter      2. Contusion of left knee, subsequent encounter      3. Contusion of right knee, subsequent encounter      4. Work related injury    RICE therapy discussed  Gentle ROM exercises discussed  WBAT BUE/BLE  Ice/heat therapy discussed  OTC ibuprofen for pain control  Rest, fluids encouraged.  AVS with medical info given.  Pt was in full understanding and agreement with the plan.  Follow-up as needed if symptoms worsen or fail to improve.  D/C MMI

## 2018-02-28 NOTE — PATIENT INSTRUCTIONS
RICE for Routine Care of Injuries  The routine care of many injuries includes rest, ice, compression, and elevation (RICE). The RICE strategy is often recommended for injuries to soft tissues, such as a muscle strain, ligament injuries, bruises, and overuse injuries. It can also be used for some bony injuries. Using the RICE strategy can help to relieve pain, lessen swelling, and enable your body to heal.  Rest  Rest is required to allow your body to heal. This usually involves reducing your normal activities and avoiding use of the injured part of your body. Generally, you can return to your normal activities when you are comfortable and have been given permission by your health care provider.  Ice  Icing your injury helps to keep the swelling down, and it lessens pain. Do not apply ice directly to your skin.  · Put ice in a plastic bag.  · Place a towel between your skin and the bag.  · Leave the ice on for 20 minutes, 2-3 times a day.  Do this for as long as you are directed by your health care provider.  Compression  Compression means putting pressure on the injured area. Compression helps to keep swelling down, gives support, and helps with discomfort. Compression may be done with an elastic bandage. If an elastic bandage has been applied, follow these general tips:  · Remove and reapply the bandage every 3-4 hours or as directed by your health care provider.  · Make sure the bandage is not wrapped too tightly, because this can cut off circulation. If part of your body beyond the bandage becomes blue, numb, cold, swollen, or more painful, your bandage is most likely too tight. If this occurs, remove your bandage and reapply it more loosely.  · See your health care provider if the bandage seems to be making your problems worse rather than better.  Elevation  Elevation means keeping the injured area raised. This helps to lessen swelling and decrease pain. If possible, your injured area should be elevated at or  above the level of your heart or the center of your chest.  WHEN SHOULD I SEEK MEDICAL CARE?  You should seek medical care if:  · Your pain and swelling continue.  · Your symptoms are getting worse rather than improving.  These symptoms may indicate that further evaluation or further X-rays are needed. Sometimes, X-rays may not show a small broken bone (fracture) until a number of days later. Make a follow-up appointment with your health care provider.  WHEN SHOULD I SEEK IMMEDIATE MEDICAL CARE?  You should seek immediate medical care if:  · You have sudden severe pain at or below the area of your injury.  · You have redness or increased swelling around your injury.  · You have tingling or numbness at or below the area of your injury that does not improve after you remove the elastic bandage.     This information is not intended to replace advice given to you by your health care provider. Make sure you discuss any questions you have with your health care provider.     Document Released: 04/01/2002 Document Revised: 12/23/2014 Document Reviewed: 11/25/2015  [employer] Interactive Patient Education ©2016 [employer]

## 2018-02-28 NOTE — LETTER
[facility]  [address] - [place] [address]  Phone:  [phone] - Fax:  [phone]   [facility] Progress Report and Disability Certification  Date of Service: 2/28/2018   No Show:  No  Date / Time of Next Visit:   MMI   Claim Information   Patient Name: Charlene Min  Claim Number:     Employer: [employer]  Date of Injury: 2/24/2018     Insurer / TPA: [employer]  ID / SSN:     Occupation: RN  Diagnosis: The primary encounter diagnosis was Contusion of right forearm, subsequent encounter. Diagnoses of Contusion of left knee, subsequent encounter, Contusion of right knee, subsequent encounter, and Work related injury were also pertinent to this visit.    Medical Information   Related to Industrial Injury? Yes    Subjective Complaints:  DOI: 2/2/418 around 7 am  PT was seen initially at the ER 4 days ago. Now, returns for follow up visit noting all symptoms have resolved. PT restates she fell on icy, uneven sidewalk on work property. She states she fell onto a bar landscaping injuring her right forearm and B/L knees. Imagine at the ER and diagnostic testing revealed only contusions. PT notes pain is resolved but initially had right forearm pain right hip pain bilateral knee pain. Abrasions to her right cheek and right knee and shin. She denies headache neck pain or facial pain weakness or numbness. Although she states that she struck the right side of her face currently does not have pain there but some swelling. Moving around makes her arm and hip more painful. Pt has not taken any Rx medications for this condition. Pt states the pain is a 0-1/10, aching in nature and worse at night. Pt denies CP, SOB, NVD, paresthesias, headaches, dizziness, change in vision, hives, or other joint pain. The pt's medication list, problem list, and allergies have been evaluated and reviewed during today's visit.     Objective Findings: Right forearm: Upon  inspection, mild ecchymoses, no edema, no erythema. +TTP, +AROM, +SILT, STR 5/5, DP 2+ B/L   B/L knees: Upon inspection, mild ecchymoses, no edema, no erythema. +TTP, +AROM, +SILT, STR 5/5, DP 2+ B/L      Pre-Existing Condition(s):     Assessment:   Condition Improved    Status: Discharged /  MMI  Permanent Disability:No    Plan: Medication  Comments:OTC ibuprofen    Diagnostics:      Comments:       Disability Information   Status: Released to Full Duty    From:     Through:   Restrictions are:     Physical Restrictions   Sitting:    Standing:    Stooping:    Bending:      Squatting:    Walking:    Climbing:    Pushing:      Pulling:    Other:    Reaching Above Shoulder (L):   Reaching Above Shoulder (R):       Reaching Below Shoulder (L):    Reaching Below Shoulder (R):      Not to exceed Weight Limits   Carrying(hrs):   Weight Limit(lb):   Lifting(hrs):   Weight  Limit(lb):     Comments:      Repetitive Actions   Hands: i.e. Fine Manipulations from Grasping:     Feet: i.e. Operating Foot Controls:     Driving / Operate Machinery:     Physician Name: Tavo Garcia P.A.-C. Physician Signature: TAVO Jenkins P.A.-C. e-Signature: Dr. Praful Rae, Medical Director   Clinic Name / Location: [address]  [place] [address] Clinic Phone Number: Dept: [phone]   Appointment Time: 11:15 Am Visit Start Time: 11:15 AM   Check-In Time:  11:10 Am Visit Discharge Time:  11:30 AM   Original-Treating Physician or Chiropractor    Page 2-Insurer/TPA    Page 3-Employer    Page 4-Employee

## 2018-03-05 ENCOUNTER — OCCUPATIONAL MEDICINE (OUTPATIENT)
Dept: OCCUPATIONAL MEDICINE | Facility: CLINIC | Age: 58
End: 2018-03-05
Payer: COMMERCIAL

## 2018-03-05 VITALS
HEIGHT: 65 IN | BODY MASS INDEX: 40.32 KG/M2 | HEART RATE: 60 BPM | DIASTOLIC BLOOD PRESSURE: 84 MMHG | OXYGEN SATURATION: 92 % | WEIGHT: 242 LBS | RESPIRATION RATE: 16 BRPM | SYSTOLIC BLOOD PRESSURE: 120 MMHG | TEMPERATURE: 97 F

## 2018-03-05 DIAGNOSIS — S80.01XD CONTUSION OF RIGHT KNEE, SUBSEQUENT ENCOUNTER: ICD-10-CM

## 2018-03-05 DIAGNOSIS — S70.01XD CONTUSION OF RIGHT HIP, SUBSEQUENT ENCOUNTER: ICD-10-CM

## 2018-03-05 DIAGNOSIS — S09.8XXD BLUNT HEAD TRAUMA, SUBSEQUENT ENCOUNTER: ICD-10-CM

## 2018-03-05 DIAGNOSIS — S80.02XD CONTUSION OF LEFT KNEE, SUBSEQUENT ENCOUNTER: ICD-10-CM

## 2018-03-05 PROCEDURE — 99212 OFFICE O/P EST SF 10 MIN: CPT | Performed by: PREVENTIVE MEDICINE

## 2018-03-05 NOTE — PROGRESS NOTES
"Subjective:      Charlene Min is a 57 y.o. female who presents with Follow-Up ([facility] DOI 2/24/18 hip,knee injury feeling better room pr1)      DOI 2/24/2018: 56 yo female presents for evaluation after a fall injury. She was walking on an uneven surface and slipped on ice. She states she fell onto bar landscaping injuring her right forearm, both knees and right hip. She was seen in ER and XR Right forearm, bilateral knees and bilateral hips were negative for fracture. Seen on 2/28 in urgent care and released to full duty.  Patient states that overall she continues to be improving from her injury. She states that she still has some abrasions on her right lower leg but those are have been healing without signs of infection. She notes her right knee is a little sore occasionally. She states she gets occasional \"stars\" in her visual field, but she often gets \"painless migraines\" which they seem similar to. She's been working full duty since Wednesday without difficulty.     HPI    ROS       Objective:     /84   Pulse 60   Temp 36.1 °C (97 °F)   Resp 16   Ht 1.651 m (5' 5\")   Wt 109.8 kg (242 lb)   SpO2 92%   BMI 40.27 kg/m²      Physical Exam    Neuro: Alert and oriented ×3. Cranial nerves grossly intact.  Right lower extremity: Abrasions over knee and mid tibia, well healing without signs of infection. Full range of motion of knee. Normal gait.       Assessment/Plan:     1. Contusion of right knee, subsequent encounter    2. Contusion of left knee, subsequent encounter    3. Contusion of right hip, subsequent encounter    4. Blunt head trauma, subsequent encounter    Released from care  Full duty  Expect resolution of symptoms over the next few weeks. If symptoms do not resolve or if symptoms worsen return to clinic      "

## 2018-03-05 NOTE — LETTER
"   [address],   [address] [place] [address]  Phone:  [phone] - Fax:  [phone]   [facility] Progress Report and Disability Certification  Date of Service: 3/5/2018   No Show:  No  Date / Time of Next Visit:  Release from care   Claim Information   Patient Name: Charlene Min  Claim Number:     Employer: [employer]  Date of Injury: 2/24/2018     Insurer / TPA: [employer]  ID / SSN:     Occupation: RN  Diagnosis: Diagnoses of Contusion of right knee, subsequent encounter, Contusion of left knee, subsequent encounter, Contusion of right hip, subsequent encounter, and Blunt head trauma, subsequent encounter were pertinent to this visit.    Medical Information   Related to Industrial Injury? Yes    Subjective Complaints:  DOI 2/24/2018: 58 yo female presents for evaluation after a fall injury. She was walking on an uneven surface and slipped on ice. She states she fell onto bar landscaping injuring her right forearm, both knees and right hip. She was seen in ER and XR Right forearm, bilateral knees and bilateral hips were negative for fracture. Seen on 2/28 in urgent care and released to full duty.  Patient states that overall she continues to be improving from her injury. She states that she still has some abrasions on her right lower leg but those are have been healing without signs of infection. She notes her right knee is a little sore occasionally. She states she gets occasional \"stars\" in her visual field, but she often gets \"painless migraines\" which they seem similar to. She's been working full duty since Wednesday without difficulty.   Objective Findings: Neuro: Alert and oriented ×3. Cranial nerves grossly intact.  Right lower extremity: Abrasions over knee and mid tibia, well healing without signs of infection. Full range of motion of knee. Normal gait.   Pre-Existing Condition(s):     Assessment:   Condition Improved    Status: " Discharged /  MMI  Permanent Disability:No    Plan:      Diagnostics:      Comments:  Released from care  Full duty  Expect resolution of symptoms over the next few weeks. If symptoms do not resolve or if symptoms worsen return to clinic    Disability Information   Status: Released to Full Duty    From:  3/5/2018  Through:   Restrictions are:     Physical Restrictions   Sitting:    Standing:    Stooping:    Bending:      Squatting:    Walking:    Climbing:    Pushing:      Pulling:    Other:    Reaching Above Shoulder (L):   Reaching Above Shoulder (R):       Reaching Below Shoulder (L):    Reaching Below Shoulder (R):      Not to exceed Weight Limits   Carrying(hrs):   Weight Limit(lb):   Lifting(hrs):   Weight  Limit(lb):     Comments:      Repetitive Actions   Hands: i.e. Fine Manipulations from Grasping:     Feet: i.e. Operating Foot Controls:     Driving / Operate Machinery:     Physician Name: Robson Alves D.O. Physician Signature: [email or web address] BARRERA D.O. e-Signature: Dr. Praful Rae, Medical Director   Clinic Name / Location: [address],   [address] [address] Clinic Phone Number: Dept: [phone]   Appointment Time: 8:00 Am Visit Start Time: 8:08 AM   Check-In Time:  7:52 Am Visit Discharge Time:  8:34 AM   Original-Treating Physician or Chiropractor    Page 2-Insurer/TPA    Page 3-Employer    Page 4-Employee

## 2018-03-20 LAB — EKG IMPRESSION: NORMAL

## 2018-04-09 ENCOUNTER — OFFICE VISIT (OUTPATIENT)
Dept: MEDICAL GROUP | Facility: MEDICAL CENTER | Age: 58
End: 2018-04-09
Payer: COMMERCIAL

## 2018-04-09 VITALS
SYSTOLIC BLOOD PRESSURE: 132 MMHG | TEMPERATURE: 97.5 F | HEIGHT: 65 IN | BODY MASS INDEX: 41.48 KG/M2 | OXYGEN SATURATION: 97 % | RESPIRATION RATE: 20 BRPM | DIASTOLIC BLOOD PRESSURE: 76 MMHG | HEART RATE: 59 BPM | WEIGHT: 249 LBS

## 2018-04-09 DIAGNOSIS — E78.5 DYSLIPIDEMIA: ICD-10-CM

## 2018-04-09 DIAGNOSIS — F32.A MILD EPISODE OF DEPRESSION: ICD-10-CM

## 2018-04-09 DIAGNOSIS — L98.9 SKIN LESIONS: ICD-10-CM
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
## 2018-04-09 DIAGNOSIS — I10 ESSENTIAL HYPERTENSION: ICD-10-CM

## 2018-04-09 DIAGNOSIS — E66.9 OBESITY (BMI 30-39.9): ICD-10-CM

## 2018-04-09 PROCEDURE — 99214 OFFICE O/P EST MOD 30 MIN: CPT | Performed by: NURSE PRACTITIONER

## 2018-04-09 ASSESSMENT — PAIN SCALES - GENERAL: PAINLEVEL: NO PAIN

## 2018-04-09 NOTE — ASSESSMENT & PLAN NOTE
Gradual weight gain in the last few years, really wants to work on weight loss  States she is not consciously making an effort to eat healthy  No sugary beverages  Not exercising on a regular basis. 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

## 2018-04-09 NOTE — ASSESSMENT & PLAN NOTE
Stable at this point on wellbutrin. Still feels depressed at times, mainly about her weight. Feels this will improve with weight loss

## 2018-04-10 NOTE — PROGRESS NOTES
"Subjective:     Chief Complaint   Patient presents with   • Fall     shin, knees, hips, R Side of Head, post-concussive symptoms   • Nutrition Counseling   • Referral Needed     Dermatology referral      Charlene Min is a 57 y.o. female here today to follow up on:    Mild episode of depression  Stable at this point on wellbutrin. Still feels depressed at times, mainly about her weight. Feels this will improve with weight loss    Essential hypertension  Stable on metoprolol-hydrochlorothiazide  No chest pain, dizziness    Obesity (BMI 30-39.9)  Gradual weight gain in the last few years, really wants to work on weight loss  States she is not consciously making an effort to eat healthy  No sugary beverages  Not exercising on a regular basis. 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 No h/o skin CA    Work related injury  Fell at work sustaining concussion. Was out of work 3 days, seen at [facility]. Now recovered    Current medicines (including changes today)  Current Outpatient Prescriptions   Medication Sig Dispense Refill   • buPROPion (WELLBUTRIN XL) 300 MG XL tablet Take 1 Tab by mouth every morning. 30 Tab 11   • metoprolol-hydrochlorothiazide (LOPRESSOR HCT) 50-25 MG per tablet Take 1 Tab by mouth every day. (Patient taking differently: Take 0.5 Tabs by mouth every day.) 30 Tab 5     No current facility-administered medications for this visit.      She  has a past medical history of Hypertension.    ROS included above     Objective:     Blood pressure 132/76, pulse (!) 59, temperature 36.4 °C (97.5 °F), resp. rate 20, height 1.651 m (5' 5\"), weight 112.9 kg (249 lb), SpO2 97 %. Body mass index is 41.44 kg/m².     Physical Exam:  General: Alert, oriented in no acute distress.  Eye contact is good, speech is normal, affect calm  Lungs: clear to auscultation bilaterally, " normal effort, no wheeze/ rhonchi/ rales.  CV: regular rate and rhythm, S1, S2, no murmur  Abdomen: soft, nontender  Ext: no edema, color normal, vascularity normal, temperature normal    Assessment and Plan:   The following treatment plan was discussed   1. Mild episode of depression  Doing well at this point on wellbutrin.   2. Essential hypertension  stable  COMP METABOLIC PANEL   3. Obesity (BMI 30-39.9)  Diet and exercise recommendations discussed. She is interested in referral to weight management  REFERRAL TO [facility] (HIP) Services Requested: Physician Medical Weight Management Program, Registered Dietitian Medicare Obesity Counseling; Reason for Referral? BMI>30; Reason for Visit: Overweight/Obesity   4. Dyslipidemia  Diet discussed. Labs in July  LIPID PROFILE   5. Skin lesions  Multiple benign appearing moles  REFERRAL TO DERMATOLOGY   6. Work related injury  Fall resulting in concussion, was treated through occupational health. Now resolved.       Followup: labs in July         Please note that this dictation was created using voice recognition software. I have worked with consultants from the vendor as well as technical experts from [facility] to optimize the interface. I have made every reasonable attempt to correct obvious errors, but I expect that there are errors of grammar and possibly content that I did not discover before finalizing the note.

## 2018-04-20 ENCOUNTER — HOSPITAL ENCOUNTER (OUTPATIENT)
Facility: MEDICAL CENTER | Age: 58
End: 2018-04-20
Attending: NURSE PRACTITIONER
Payer: COMMERCIAL

## 2018-04-20 PROCEDURE — 82274 ASSAY TEST FOR BLOOD FECAL: CPT

## 2018-04-24 LAB — HEMOCCULT STL QL IA: NEGATIVE

## 2018-05-10 ENCOUNTER — APPOINTMENT (OUTPATIENT)
Dept: HEALTH INFORMATION MANAGEMENT | Facility: MEDICAL CENTER | Age: 58
End: 2018-05-10
Payer: COMMERCIAL

## 2018-06-05 ENCOUNTER — HOSPITAL ENCOUNTER (OUTPATIENT)
Dept: LAB | Facility: MEDICAL CENTER | Age: 58
End: 2018-06-05
Attending: NURSE PRACTITIONER
Payer: COMMERCIAL

## 2018-06-05 DIAGNOSIS — E78.5 DYSLIPIDEMIA: ICD-10-CM

## 2018-06-05 DIAGNOSIS — I10 ESSENTIAL HYPERTENSION: ICD-10-CM

## 2018-06-05 LAB
ALBUMIN SERPL BCP-MCNC: 3.8 G/DL (ref 3.2–4.9)
ALBUMIN/GLOB SERPL: 1.3 G/DL
ALP SERPL-CCNC: 70 U/L (ref 30–99)
ALT SERPL-CCNC: 15 U/L (ref 2–50)
ANION GAP SERPL CALC-SCNC: 5 MMOL/L (ref 0–11.9)
AST SERPL-CCNC: 14 U/L (ref 12–45)
BILIRUB SERPL-MCNC: 0.6 MG/DL (ref 0.1–1.5)
BUN SERPL-MCNC: 13 MG/DL (ref 8–22)
CALCIUM SERPL-MCNC: 9.5 MG/DL (ref 8.5–10.5)
CHLORIDE SERPL-SCNC: 108 MMOL/L (ref 96–112)
CHOLEST SERPL-MCNC: 153 MG/DL (ref 100–199)
CO2 SERPL-SCNC: 27 MMOL/L (ref 20–33)
CREAT SERPL-MCNC: 0.69 MG/DL (ref 0.5–1.4)
GLOBULIN SER CALC-MCNC: 2.9 G/DL (ref 1.9–3.5)
GLUCOSE SERPL-MCNC: 104 MG/DL (ref 65–99)
HDLC SERPL-MCNC: 32 MG/DL
LDLC SERPL CALC-MCNC: 97 MG/DL
POTASSIUM SERPL-SCNC: 3.6 MMOL/L (ref 3.6–5.5)
PROT SERPL-MCNC: 6.7 G/DL (ref 6–8.2)
SODIUM SERPL-SCNC: 140 MMOL/L (ref 135–145)
TRIGL SERPL-MCNC: 122 MG/DL (ref 0–149)

## 2018-06-05 PROCEDURE — 80053 COMPREHEN METABOLIC PANEL: CPT

## 2018-06-05 PROCEDURE — 36415 COLL VENOUS BLD VENIPUNCTURE: CPT

## 2018-06-05 PROCEDURE — 80061 LIPID PANEL: CPT

## 2018-06-07 ENCOUNTER — OFFICE VISIT (OUTPATIENT)
Dept: HEALTH INFORMATION MANAGEMENT | Facility: MEDICAL CENTER | Age: 58
End: 2018-06-07
Payer: COMMERCIAL

## 2018-06-07 VITALS
SYSTOLIC BLOOD PRESSURE: 116 MMHG | WEIGHT: 246.7 LBS | OXYGEN SATURATION: 94 % | HEIGHT: 65 IN | DIASTOLIC BLOOD PRESSURE: 82 MMHG | HEART RATE: 63 BPM | BODY MASS INDEX: 41.1 KG/M2

## 2018-06-07 DIAGNOSIS — E66.9 OBESITY (BMI 30-39.9): ICD-10-CM

## 2018-06-07 DIAGNOSIS — I10 ESSENTIAL HYPERTENSION: ICD-10-CM

## 2018-06-07 PROCEDURE — 99205 OFFICE O/P NEW HI 60 MIN: CPT | Performed by: INTERNAL MEDICINE

## 2018-06-07 PROCEDURE — 97802 MEDICAL NUTRITION INDIV IN: CPT | Performed by: DIETITIAN, REGISTERED

## 2018-06-07 NOTE — PROGRESS NOTES
Bariatric Medicine H&P  Chief Complaint   Patient presents with   • Obesity       Referred by: ALESSANDRA Young    History of Present Illness:   Charlene Min is a 57 y.o.  female who presents for weight management and to help address co-morbidities related to overweight, including hypertension.    The patient has been overweight for years.  She realizes now she is getting older, she wants to really improve her health.  She wants a more structured program that she can do on her own.  She had used weight watchers in the past, did have rapid weight loss.  In 2012 she weighed 190 pounds, lost about 60 pounds.  She gained it back, by traveling as a nurse for work, paying less attention to what she is eating and preparing less food.  She also works nights.    Typical intake includes snacking all night at work including Trail mix, chips, M&Ms.  She keeps oatmeal or cream of wheat in her locker, does not usually have.  In the morning, before she goes to sleep she has eggs, toast and sausage or [employer] [place] breakfast bar.  For lunch after she wakes up, she gets drive-through fast food.  For dinner, she has pasta with meat, not many vegetables, frozen meals.  Mostly drinking water and coffee, occasionally tea or ginger ale.    She calculated her resting energy expenditure and total calorie intake at one point years ago, noted at that time she was eating far more calories than her energy expenditure.    She would be interested in using 1-2 meal replacements a day, instead of drive-through at lunch, and also once during her work shift overnight.  She does not mind preparing food, if she is held accountable and given tips on what to eat and what foods to avoid.  She is interested in tracking her intake, found it helpful in the past.  She has purchased food calorie guides to look through before.  She would consider weight loss medication.    Blood pressure had been difficult to control, more recently well controlled on  "Lopressor with hydrochlorothiazide.  Depression now controlled on Wellbutrin XL.      Behavior-Related History:  Binge eating screen: Negative       Exercise:   None.       Review of Systems   Lower extremity edema.  Depression, medication controlled.  Sleep apnea screen: Negative  All other ROS were reviewed with patient today and are negative.      PMH/PSH:  I have reviewed the patient's medical, social and family history, allergies, and medications today.  Prior records reviewed.  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    Physical Exam:   /82   Pulse 63   Ht 1.651 m (5' 5\")   Wt 111.9 kg (246 lb 11.2 oz)   SpO2 94%   BMI 41.05 kg/m²   Waist: 49 in  Body fat % 54  REE 1755 kcal/day    Constitutional: Oriented to person, place, and time and well-developed, well-nourished, and in no distress.    HENT: Mild facial plethora.  No Cushingoid features.  No scalloped tongue.  No dental erosions.  No swollen parotids.  Head: Normocephalic.   Eyes: EOM are normal. Pupils are equal, round, and reactive to light. No periorbital edema.  No lateral thinning of eyebrows.  No vertical nystagmus.  Neck: Normal range of motion. Neck supple. No thyromegaly present. No buffalo hump.  Cardiovascular: Normal rate and regular rhythm.  No murmur heard.  Pulmonary/Chest: Effort normal and breath sounds normal. No wheezes.   Abdominal: Soft. Bowel sounds are normal. No pannus.  No ascites.  No palpable hepatosplenomegaly.    Musculoskeletal: Normal range of motion. No edema.   Neurological: Alert and oriented to person, place, and time. Normal reflexes. No cranial nerve deficit. No muscle weakness.  Gait normal.   Skin: Warm and dry. Not diaphoretic. No hirsuitism.  No acanthosis nigricans.  Not excessively dry, scaly.  No acne.  No bruising/ecchymosis.  " No hyperpigmentation.  Multiple neck and facial xanthomas or acrochordon.  .  Psychiatric: Mood, memory, affect and judgment normal.     Laboratory:   Prior labs reviewed.  18 glucose 104  EK18 normal sinus rhythm, rate 142, no significant ST-T abnormalities.  Corrected QT 0.615.      Dietitian Assessment: I have reviewed the Dietitian's assessment related to this encounter.       ASSESSMENT/PLAN:  Body mass index is 41.05 kg/m².   Obesity Stage ([place]) 1; Class 3    1. Obesity (BMI 30-39.9)     2. Essential hypertension       The patient is likely exceeding her energy expenditure with fast food intake and significant snacking.  Will begin 2 meal replacements.  Food journal for behavior modification.  Start walking.  Monitor blood pressure with weight loss.    The patient and I have discussed at length and agree to the following recommendations, which are all addressing the above diagnoses:    Weight Goal: 5% wt loss at one month after start (pt goal weight is 180 lb)  Diet:   High Protein/Low Carb Meal and 2 snacks between meals daily  1 meal replacement at lunch, 1 at work overnight  >100 g protein, <100 g total carbs daily  64+ oz water per day  Avoid sweet drinks and sodas  Track daily intake  Physical Activity: Start walking daily  Risk level for moderate/vigorous exercise program:   Low  New Rx:   None  Side Effects: Will review consent if applicable.  Follow-up: 2 weeks  f/u EKG at next visit, given abnormal ekg 2018 with QT prolongation and tachycardia      Face to face time spent 60 minutes,  with >50% of time devoted to one on one counseling on weight management issues, as documented above.      Patient's body mass index is 41.05 kg/m². Exercise and nutrition counseling were performed at this visit.        Thank you for your referral!

## 2018-06-07 NOTE — PROGRESS NOTES
6/7/2018   Referring Provider: MARIA T Virgen       Time in/out: 10:04-10:26am    Anthropometrics/Objective  Weight: 246.7 lbs  BMI: 41.1  Stated Goal Weight: NA  See comprehensive patient history form for further information     Subjective:  Pt is here today for the initial screening visit for the medical weight management program.   - works night shifts as a nurse  - still have a fairly regularly eating routine although snacks all during the night on trail mix, candy, chips etc.   - likes to cook and willing to prepare food  - will have fast food for lunch often  - has found in the past that keeping a food journal has been a great tool for her to stay aware of what she is eating    See Medical Questionnaire for more detailed diet history     Nutrition Diagnosis (PES Statement)  · Obesity related to excessive energy intake and inadequate energy expenditure as evidenced by BMI >30     Client history:  Condition(s) associated with a diagnosis or treatment (specify): hypertension    Biochemical data, medical test and procedures  No results found for: HBA1C@  No results found for: POCGLUCOSE  Lab Results   Component Value Date/Time    CHOLSTRLTOT 153 06/05/2018 01:11 PM    LDL 97 06/05/2018 01:11 PM    HDL 32 (A) 06/05/2018 01:11 PM    TRIGLYCERIDE 122 06/05/2018 01:11 PM         Nutrition Intervention  Nutrition Prescription  Recommended Daily Kcals:  <1700 Kcal based on REE of 1755   Recommended Daily Protein: at least 85 grams a day (20%)     Meal Plan Recommendation   High Protein diet with 1-2 meal replacements per day.   1 meal replacement for lunch (12 noon) on days she does not work and 1 meal replacement for dinner (12 am) on days she works. Choose 1 oz protein + NS vegetables for snacks at work, have additional NS veggies to help avoid processed high caloriesnacks.     On days off: 8am breakfast, 12 noon lunch, 6pm dinner  Work days: 8am breakfast, 6pm lunch, 12am dinner in the middle of her  shift)    Comprehensive Nutrition Education Instruction or training leading to in-depth nutrition related knowledge about:  Benefits to following meal plan, Combine carb, protein and fat at each meal, Meal timing and spacing, Portion control, Sweets and alcohol in moderation.   Handouts provided regarding topics discussed     Monitoring & Evaluation Plan    Behavioral-Environmental:  Behavior: Keep a food journal and bring to next appointment (written)  Physical activity: Not discussed today    Food / Nutrient Intake:  Food intake: Follow meal plan as discussed. Avoid concentrated sweets and processed carbs. Use the plate method for portion control and macronutrient balance. Limit carbs/starch to up to 1/2 cup per meal. Snacks should be 1oz protein + ns veggies.     Fluid intake: Consume at least 64 oz water per day. Avoid all sweetened beverages. Limit coffee to 1 cup a day (ideally no cream or sugar). Avoid alcohol.      Physical Signs / Symptoms:  Weight change 1-2 lbs/week or 5% within the first month, blood pressure improved      Assessment Notes:  Charlene is motivated to make some changes to her eating habits with a goal of improving her overall health and her blood pressure. Her snacking habits at work and eating out for lunch are her biggest struggle. She loves to cook and is willing to do so. She will start with 1 set meal replacement a day either for lunch or dinner depending on if she is working or off. Recommended she focus on the snack list provided (1 oz protein + NS veggies) and have more non-starchy vegetables if she feels the need to snack throughout her night shift. Keeping a food journal has been a helpful tool for her in the past to keep her mindful of what she eats and she will restart doing this. We will review her food journal on her next visit. Ideally the plan should help her cut down on her overall sodium intake, but eventually would benefit from discussing low sodium diet recommendations in  further detail in the future. She will follow up with and RD only on her next visit and then in 6 weeks with Dr. Estrada.    Follow up 2 week with RD only, 6 weeks with Dr. Estrada

## 2018-06-21 ENCOUNTER — APPOINTMENT (OUTPATIENT)
Dept: HEALTH INFORMATION MANAGEMENT | Facility: MEDICAL CENTER | Age: 58
End: 2018-06-21
Payer: COMMERCIAL

## 2018-06-29 DIAGNOSIS — I10 ESSENTIAL HYPERTENSION: ICD-10-CM

## 2018-07-02 RX ORDER — METOPROLOL TARTRATE AND HYDROCHLOROTHIAZIDE 50; 25 MG/1; MG/1
0.5 TABLET ORAL DAILY
Qty: 45 TAB | Refills: 1 | Status: SHIPPED | OUTPATIENT
Start: 2018-07-02 | End: 2019-01-23 | Stop reason: SDUPTHER

## 2018-07-23 ENCOUNTER — OFFICE VISIT (OUTPATIENT)
Dept: HEALTH INFORMATION MANAGEMENT | Facility: MEDICAL CENTER | Age: 58
End: 2018-07-23
Payer: COMMERCIAL

## 2018-07-23 VITALS
OXYGEN SATURATION: 94 % | DIASTOLIC BLOOD PRESSURE: 80 MMHG | HEIGHT: 65 IN | SYSTOLIC BLOOD PRESSURE: 120 MMHG | HEART RATE: 70 BPM | WEIGHT: 249.7 LBS | BODY MASS INDEX: 41.6 KG/M2

## 2018-07-23 DIAGNOSIS — E66.9 OBESITY (BMI 30-39.9): ICD-10-CM

## 2018-07-23 DIAGNOSIS — I10 ESSENTIAL HYPERTENSION: ICD-10-CM

## 2018-07-23 PROCEDURE — 99213 OFFICE O/P EST LOW 20 MIN: CPT | Performed by: INTERNAL MEDICINE

## 2018-07-23 NOTE — PROGRESS NOTES
Bariatric Medicine Follow Up  Chief Complaint   Patient presents with   • Weight Gain       History of Present Illness:   Charlene Min is a 58 y.o. female who presents for weight management follow-up and to help address co-morbidities related to overweight, including HTN.    During the patient's last visit, the following were discussed and recommended:  Weight Goal: 5% wt loss at one month after start (pt goal weight is 180 lb)  Diet:   High Protein/Low Carb Meal and 2 snacks between meals daily  1 meal replacement at lunch, 1 at work overnight  >100 g protein, <100 g total carbs daily  64+ oz water per day  Avoid sweet drinks and sodas  Track daily intake  Physical Activity: Start walking daily  Risk level for moderate/vigorous exercise program:   Low  New Rx:   None  Side Effects: Will review consent if applicable.  Follow-up: 2 weeks  f/u EKG at next visit, given abnormal ekg 2/2018 with QT prolongation and tachycardia    Has felt overwhelmed, not making food-related changes.  Luis [employer] gregg did sustain her at work, when she used them.  Did not consistently use meal replacement shakes as she did not feel she had time to make them at work.  Would consider making them ahead of time and bring them in to work frozen or adding ice at work.    Overall very angry with herself, regarding lack of progress.  Wants to consider whether she returns.  Does not feel this program is providing what she needs for weight loss.  Feels she could get meal replacement bars over-the-counter cheaper.    Wants to consider weight loss medication, but refusing EKG repeat at this time.  EKG was to be repeated for prolonged QT.    Continues on Wellbutrin for mood and depression, thinks it helps some of the time but not completely.  Blood pressure controlled on metoprolol/hydrochlorothiazide    Exercise:   None     Review of Systems   Denies lightheadedness.  Very fatigued at this time of the day as she is usually sleeping but could not get a  "morning appointment.  All other ROS were reviewed and are otherwise unchanged from my previous visit with patient.    Physical Exam:    /80   Pulse 70   Ht 1.651 m (5' 5\")   Wt 113.3 kg (249 lb 11.2 oz)   SpO2 94%   BMI 41.55 kg/m²   Waist: 49 in  Body fat % 52.5  REE 1766 kcal/day    Weight change since last visit: +3 lb  Waist Circum change since last visit: 0 in     Constitutional: Oriented to person, place, and time and well-developed, well-nourished, and in no distress.    Head: Normocephalic.    Musculoskeletal: Normal range of motion. No edema.   Neurological: Alert and oriented to person, place, and time. No muscle weakness.  Gait normal.   Skin: Warm and dry. Not diaphoretic.   Psychiatric: Mood, memory, affect and judgment normal.     Laboratory:   None new.      Dietitian Assessment: Patient deferred.    ASSESSMENT/PLAN:  Body mass index is 41.55 kg/m².    Obesity Stage ([place]): 1; Class 3    1. Obesity (BMI 30-39.9)     2. Essential hypertension       The patient does not appear ready to make significant changes at this time.  If she returns, will consider encouraging further meal replacement, weight loss medication.  Information given today on weight loss medication should she choose to consider one of those such as Contrave.  Continue antihypertensives that she is doing, monitor with any further weight loss.    The patient and I have discussed at length and agree to the following recommendations, which are all addressing the above diagnoses:    Weight Goal: 3-5% wt loss each month (pt goal weight is 180 lb)  Diet: High Protein/Low Carb Meals and 2 snacks between meals daily  >100 g protein, <100 g total carbs daily  64+ oz water per day  Avoid sweet drinks and sodas  Track daily intake  Physical Activity: Start walking as tolerated  Risk level for moderate/vigorous exercise program: Low  New Rx: None  We will need repeat EKG to ensure no QT prolongation prior to starting weight loss " medication  Behavior change: Continue to assess patient's readiness to change  Follow-up: As per patient request when she is ready to return    Patient's body mass index is 41.55 kg/m². Exercise and nutrition counseling were performed at this visit.

## 2018-08-15 ENCOUNTER — OFFICE VISIT (OUTPATIENT)
Dept: MEDICAL GROUP | Facility: MEDICAL CENTER | Age: 58
End: 2018-08-15
Payer: COMMERCIAL

## 2018-08-15 VITALS
HEART RATE: 57 BPM | TEMPERATURE: 97.1 F | OXYGEN SATURATION: 96 % | BODY MASS INDEX: 41.65 KG/M2 | RESPIRATION RATE: 16 BRPM | SYSTOLIC BLOOD PRESSURE: 122 MMHG | DIASTOLIC BLOOD PRESSURE: 70 MMHG | HEIGHT: 65 IN | WEIGHT: 250 LBS

## 2018-08-15 DIAGNOSIS — R94.31 ABNORMAL EKG: ICD-10-CM

## 2018-08-15 DIAGNOSIS — E66.9 OBESITY (BMI 30-39.9): ICD-10-CM

## 2018-08-15 PROCEDURE — 93000 ELECTROCARDIOGRAM COMPLETE: CPT | Performed by: NURSE PRACTITIONER

## 2018-08-15 PROCEDURE — 99214 OFFICE O/P EST MOD 30 MIN: CPT | Mod: 25 | Performed by: NURSE PRACTITIONER

## 2018-08-15 ASSESSMENT — PATIENT HEALTH QUESTIONNAIRE - PHQ9: CLINICAL INTERPRETATION OF PHQ2 SCORE: 0

## 2018-08-15 NOTE — PROGRESS NOTES
"Subjective:     Chief Complaint   Patient presents with   • Follow-Up     FOR EKG     Charlene Min is a 58 y.o. female here today to follow up on an abnormal EKG from ER visit in March of this year.  She sustained a fall in March, was evaluated in the ER and noted to have prolonged QT on EKG at that time.  She recently has been seeing weight loss specialist Dr. Ceja who recommended further evaluation.  She denies any episodes of shortness of breath, fatigue, syncope.  She has had 2 occasions that she can recall where she felt that her heart was racing, last episode being 5 years ago  Blood pressure is controlled on metoprolol hydrochlorothiazide 50-25 mg daily  Mood is stable on Wellbutrin  Current medicines (including changes today)  Current Outpatient Prescriptions   Medication Sig Dispense Refill   • metoprolol-hydrochlorothiazide (LOPRESSOR HCT) 50-25 MG per tablet Take 0.5 Tabs by mouth every day. 45 Tab 1   • buPROPion (WELLBUTRIN XL) 300 MG XL tablet Take 1 Tab by mouth every morning. 30 Tab 11     No current facility-administered medications for this visit.      She  has a past medical history of Hypertension.    ROS included above     Objective:     Blood pressure 122/70, pulse (!) 57, temperature 36.2 °C (97.1 °F), resp. rate 16, height 1.651 m (5' 5\"), weight 113.4 kg (250 lb), SpO2 96 %. Body mass index is 41.6 kg/m².     Physical Exam:  General: Alert, oriented in no acute distress.  Eye contact is good, speech is normal, affect calm  Lungs: clear to auscultation bilaterally, normal effort, no wheeze/ rhonchi/ rales.  CV: regular rate and rhythm, S1, S2, no murmur  Abdomen: soft, nontender  Ext: no edema, color normal, vascularity normal, temperature normal    Assessment and Plan:   The following treatment plan was discussed   1. Abnormal EKG   EKG repeated in the office today, no QT prolongation noted.  She does, however, have a possible incomplete bundle branch block.  Will refer to cardiology " for further evaluation  EKG    REFERRAL TO CARDIOLOGY   2. Obesity (BMI 30-39.9)         Followup: 6 months         Please note that this dictation was created using voice recognition software. I have worked with consultants from the vendor as well as technical experts from [employer] to optimize the interface. I have made every reasonable attempt to correct obvious errors, but I expect that there are errors of grammar and possibly content that I did not discover before finalizing the note.

## 2018-09-19 ENCOUNTER — PATIENT MESSAGE (OUTPATIENT)
Dept: MEDICAL GROUP | Facility: MEDICAL CENTER | Age: 58
End: 2018-09-19

## 2018-10-09 ENCOUNTER — NON-PROVIDER VISIT (OUTPATIENT)
Dept: OCCUPATIONAL MEDICINE | Facility: CLINIC | Age: 58
End: 2018-10-09

## 2018-10-09 DIAGNOSIS — Z23 NEED FOR VACCINATION: ICD-10-CM

## 2018-10-09 PROCEDURE — 90686 IIV4 VACC NO PRSV 0.5 ML IM: CPT | Performed by: PREVENTIVE MEDICINE

## 2018-11-06 ENCOUNTER — OFFICE VISIT (OUTPATIENT)
Dept: CARDIOLOGY | Facility: MEDICAL CENTER | Age: 58
End: 2018-11-06
Payer: COMMERCIAL

## 2018-11-06 VITALS
SYSTOLIC BLOOD PRESSURE: 128 MMHG | HEIGHT: 65 IN | DIASTOLIC BLOOD PRESSURE: 76 MMHG | WEIGHT: 250 LBS | OXYGEN SATURATION: 97 % | HEART RATE: 62 BPM | BODY MASS INDEX: 41.65 KG/M2

## 2018-11-06 DIAGNOSIS — I10 ESSENTIAL HYPERTENSION, BENIGN: ICD-10-CM

## 2018-11-06 DIAGNOSIS — R94.31 ABNORMAL ELECTROCARDIOGRAM (ECG) (EKG): ICD-10-CM

## 2018-11-06 PROCEDURE — 99244 OFF/OP CNSLTJ NEW/EST MOD 40: CPT | Performed by: INTERNAL MEDICINE

## 2018-11-06 ASSESSMENT — ENCOUNTER SYMPTOMS
SENSORY CHANGE: 0
HEARTBURN: 0
VOMITING: 0
PALPITATIONS: 0
DIZZINESS: 0
FOCAL WEAKNESS: 0
SHORTNESS OF BREATH: 0
LOSS OF CONSCIOUSNESS: 0
WEIGHT LOSS: 0
BRUISES/BLEEDS EASILY: 0
DEPRESSION: 1
FEVER: 0
BLURRED VISION: 0
DIARRHEA: 0
NAUSEA: 0
WEAKNESS: 0

## 2018-11-06 NOTE — LETTER
[facility]   [address]  [place] [address]  Phone: [phone]  Fax: [phone]              Charlene Min  1960    Encounter Date: 11/6/2018    Susie Padron M.D.          PROGRESS NOTE:  Chief Complaint   Patient presents with   • Abnormal EKG     new patient       Subjective:   Charlene Min is a 58 y.o. female who presents today for evaluation of abnormal electrocardiogram.    The patient is seen at the request of STEPHEN Young for above issue.  He has about 4-year history of hypertension and family history of coronary artery disease.  She is also overweight and has been trying to control her weight.  At the end of February this year, she was seen in the emergency room after a ground-level fall she felt some discomfort in her chest.  An electrocardiogram was performed.  The heart rate at the time by my review was in the low 60 with QT interval 400 ms.  A computerized interpretation however stated that the heart rate was 142 bpm and corrected QTc was 615 with subsequent narrative statement of prolonged QT interval.  She was seen in follow-up in her primary care provider in August.    Another electrocardiogram was performed which showed heart rate of 53 bpm along with QT of 430 ms and QTc 404.  The QRS duration was felt to be at 110 ms with subsequent interpretation of incomplete left bundle branch block.  She was recently seen in bariatric medic medicine to help control her weight there was some concern about the findings on her electrocardiogram and subsequent referred to our clinic for further evaluation.  The patient denies any significant cardiac symptoms.  There has been no recent changes in her medications.  Her blood pressure has been well controlled on low-dose combination of metoprolol hydrochlorothiazide.      Past Medical History:   Diagnosis Date   • Hyperlipidemia    • Hypertension      Past Surgical History:   Procedure  Laterality Date   • ABDOMINAL HYSTERECTOMY TOTAL     • TONSILLECTOMY       Family History   Problem Relation Age of Onset   • Heart Disease Mother    • Hypertension Mother    • Heart Attack Mother    • Lung Disease Father    • Heart Disease Father    • Cancer Brother         colorectal     Social History     Social History   • Marital status: Single     Spouse name: N/A   • Number of children: N/A   • Years of education: N/A     Occupational History   • Not on file.     Social History Main Topics   • Smoking status: Former Smoker     Years: 35.00     Types: Cigarettes   • Smokeless tobacco: Never Used   • Alcohol use No   • Drug use: No   • Sexual activity: Not on file     Other Topics Concern   • Not on file     Social History Narrative   • No narrative on file     Allergies   Allergen Reactions   • Pcn [Penicillins]      Outpatient Encounter Prescriptions as of 11/6/2018   Medication Sig Dispense Refill   • metoprolol-hydrochlorothiazide (LOPRESSOR HCT) 50-25 MG per tablet Take 0.5 Tabs by mouth every day. 45 Tab 1   • buPROPion (WELLBUTRIN XL) 300 MG XL tablet Take 1 Tab by mouth every morning. 30 Tab 11     No facility-administered encounter medications on file as of 11/6/2018.      Review of Systems   Constitutional: Negative for fever, malaise/fatigue and weight loss.        Has been gaining weight about 10 pounds a year   HENT: Negative for congestion and hearing loss.    Eyes: Negative for blurred vision.   Respiratory: Negative for shortness of breath.         No snoring or daytime somnolence   Cardiovascular: Negative for chest pain, palpitations and leg swelling.   Gastrointestinal: Negative for diarrhea, heartburn, nausea and vomiting.   Neurological: Negative for dizziness, sensory change, focal weakness, loss of consciousness and weakness.   Endo/Heme/Allergies: Does not bruise/bleed easily.   Psychiatric/Behavioral: Positive for depression.   All other systems reviewed and are negative.        "Objective:   /76 (BP Location: Left arm)   Pulse 62   Ht 1.651 m (5' 5\")   Wt 113.4 kg (250 lb)   SpO2 97%   BMI 41.60 kg/m²      Physical Exam   Constitutional: She is oriented to person, place, and time. No distress.   Obese   HENT:   Head: Normocephalic and atraumatic.   Eyes: Pupils are equal, round, and reactive to light. EOM are normal.   Neck: Neck supple. No JVD present. No thyromegaly present.   Cardiovascular: Normal rate, regular rhythm and normal heart sounds.  Exam reveals no gallop.    No murmur heard.  Pulmonary/Chest: Effort normal and breath sounds normal. No respiratory distress. She has no rales.   Abdominal: Soft. She exhibits no distension. There is no tenderness.   Musculoskeletal: She exhibits no edema or deformity.   Neurological: She is alert and oriented to person, place, and time.   Skin: Skin is warm.   Psychiatric: She has a normal mood and affect. Her behavior is normal.     Electrocardiogram from August 15 by my review show sinus bradycardia with mild nonspecific intraventricular conduction delay, normal QTc and no significant ST changes  Most recent CMP from June were normal except glucose of 104.  LDL was 97 HDL 32    Assessment:     1. Abnormal electrocardiogram (ECG) (EKG)  EC-ECHOCARDIOGRAM COMPLETE W/O CONT   2. Essential hypertension, benign  EC-ECHOCARDIOGRAM COMPLETE W/O CONT       Medical Decision Making:  Today's Assessment / Status / Plan:     I did review both electrocardiograms with the patient.  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  The computerized interpretation of prolonged QTc on EKG in February was incorrect. This was clearly due to inaccurate heart rate measurement. This was not present on repeat EKG in August.  I did not believe that her EKG in August showed incomplete left bundle branch block.  There was mild nonspecific interventricular conduction delay.  She has chronic hypertension and on " Wellbutrin.  We obtain echocardiography to assess cardiac function and rule out any significant ventricular hypertrophy as well as any other structural abnormality.  From cardiac standpoint, she most likely should be able to take weight loss medication if she prefer to do so.  The patient at this point however informed me that she would prefer diet control for now.  Her blood pressure According to her has been well controlled. We will continue her current medications. We will keep you posted about finding further recommendation as they become available.  Thank you for allowing us to participate in the care of this patient.      No Recipients

## 2018-12-04 ENCOUNTER — TELEPHONE (OUTPATIENT)
Dept: CARDIOLOGY | Facility: MEDICAL CENTER | Age: 58
End: 2018-12-04

## 2018-12-04 NOTE — TELEPHONE ENCOUNTER
Received fax from Imaging - schedulers stating that they have made several attempts to schedule patient and are unable to.    Attempted to call patient, unable to reach.  Left voicemail on her voicemail to schedule Echo testing with the schedulers and left phone number [phone].  Will try again to reach her at a later time.    Fax sent to scanning for reference.

## 2018-12-14 NOTE — TELEPHONE ENCOUNTER
Returned patient call and states that the MD told her that she didn't need to do it, it was just for baseline.  Pt continued to state I do not want to do it at this time.  She apologize for not following up and states no other concerns or questions at this time.  Pt is appreciative of information given.

## 2019-01-15 ENCOUNTER — OFFICE VISIT (OUTPATIENT)
Dept: URGENT CARE | Facility: CLINIC | Age: 59
End: 2019-01-15
Payer: COMMERCIAL

## 2019-01-15 VITALS
DIASTOLIC BLOOD PRESSURE: 70 MMHG | WEIGHT: 250 LBS | OXYGEN SATURATION: 99 % | SYSTOLIC BLOOD PRESSURE: 112 MMHG | HEART RATE: 60 BPM | HEIGHT: 65 IN | RESPIRATION RATE: 20 BRPM | BODY MASS INDEX: 41.65 KG/M2 | TEMPERATURE: 97.7 F

## 2019-01-15 DIAGNOSIS — J98.8 RTI (RESPIRATORY TRACT INFECTION): ICD-10-CM

## 2019-01-15 PROCEDURE — 99214 OFFICE O/P EST MOD 30 MIN: CPT | Performed by: FAMILY MEDICINE

## 2019-01-15 RX ORDER — PREDNISONE 10 MG/1
30 TABLET ORAL EVERY MORNING
Qty: 21 TAB | Refills: 0 | Status: SHIPPED | OUTPATIENT
Start: 2019-01-15 | End: 2019-01-22

## 2019-01-15 RX ORDER — AZITHROMYCIN 250 MG/1
TABLET, FILM COATED ORAL
Qty: 6 TAB | Refills: 0 | Status: SHIPPED | OUTPATIENT
Start: 2019-01-15 | End: 2019-01-23

## 2019-01-15 RX ORDER — ALBUTEROL SULFATE 90 UG/1
2 AEROSOL, METERED RESPIRATORY (INHALATION) EVERY 6 HOURS PRN
Qty: 8.5 G | Refills: 3 | Status: SHIPPED | OUTPATIENT
Start: 2019-01-15 | End: 2019-04-15

## 2019-01-15 ASSESSMENT — ENCOUNTER SYMPTOMS
FOCAL WEAKNESS: 0
CHILLS: 0
ORTHOPNEA: 0
DIZZINESS: 0
FEVER: 0
COUGH: 1
SINUS PAIN: 1
HEMOPTYSIS: 0
SHORTNESS OF BREATH: 0

## 2019-01-15 NOTE — LETTER
January 15, 2019         Patient: Charlene Min   YOB: 1960   Date of Visit: 1/15/2019           To Whom it May Concern:    Charlene Min was seen in my clinic on 1/15/2019. She may return to work in 1-3 days.    If you have any questions or concerns, please don't hesitate to call.        Sincerely,           Gary Hood M.D.  Electronically Signed

## 2019-01-16 NOTE — PROGRESS NOTES
Subjective:      Charlene Min is a 58 y.o. female who presents with Cough (X 5 days dry cough and congestion .)      - This is a very pleasant, well and non-toxic appearing 58 y.o. female with complaints of 5 days cough/sinus. otc meds no thelping. No NVFC.           ALLERGIES:  Pcn [penicillins]     PMH:  Past Medical History:   Diagnosis Date   • Hyperlipidemia    • Hypertension         PSH:  Past Surgical History:   Procedure Laterality Date   • ABDOMINAL HYSTERECTOMY TOTAL     • TONSILLECTOMY         MEDS:    Current Outpatient Prescriptions:   •  Hydrocod Polst-CPM Polst ER (TUSSIONEX) 10-8 MG/5ML Suspension Extended Release, Take 5 mL by mouth every 12 hours as needed for Cough for up to 12 days., Disp: 120 mL, Rfl: 0  •  predniSONE (DELTASONE) 10 MG Tab, Take 3 Tabs by mouth every morning for 7 days., Disp: 21 Tab, Rfl: 0  •  albuterol (PROAIR HFA) 108 (90 Base) MCG/ACT Aero Soln inhalation aerosol, Inhale 2 Puffs by mouth every 6 hours as needed for Shortness of Breath., Disp: 8.5 g, Rfl: 3  •  azithromycin (ZITHROMAX) 250 MG Tab, Use as directed, Disp: 6 Tab, Rfl: 0  •  metoprolol-hydrochlorothiazide (LOPRESSOR HCT) 50-25 MG per tablet, Take 0.5 Tabs by mouth every day., Disp: 45 Tab, Rfl: 1  •  buPROPion (WELLBUTRIN XL) 300 MG XL tablet, Take 1 Tab by mouth every morning., Disp: 30 Tab, Rfl: 11    ** I have documented what I find to be significant in regards to past medical, social, family and surgical history  in my HPI or under PMH/PSH/FH review section, otherwise it is contributory **           HPI    Review of Systems   Constitutional: Negative for chills and fever.   HENT: Positive for congestion and sinus pain.    Respiratory: Positive for cough. Negative for hemoptysis and shortness of breath.    Cardiovascular: Negative for chest pain and orthopnea.   Neurological: Negative for dizziness and focal weakness.          Objective:     /70   Pulse 60   Temp 36.5 °C (97.7 °F)   Resp 20   Ht  "1.651 m (5' 5\")   Wt 113.4 kg (250 lb)   SpO2 99%   BMI 41.60 kg/m²      Physical Exam   Constitutional: She appears well-developed. No distress.   HENT:   Head: Normocephalic and atraumatic.   Mouth/Throat: Oropharynx is clear and moist.   Eyes: Conjunctivae are normal.   Neck: Neck supple.   Cardiovascular: Regular rhythm.    No murmur heard.  Pulmonary/Chest: Effort normal and breath sounds normal. No respiratory distress.   Neurological: She is alert. She exhibits normal muscle tone.   Skin: Skin is warm and dry.   Psychiatric: She has a normal mood and affect. Judgment normal.   Nursing note and vitals reviewed.              Assessment/Plan:         1. RTI (respiratory tract infection)  Hydrocod Polst-CPM Polst ER (TUSSIONEX) 10-8 MG/5ML Suspension Extended Release    predniSONE (DELTASONE) 10 MG Tab    albuterol (PROAIR HFA) 108 (90 Base) MCG/ACT Aero Soln inhalation aerosol    azithromycin (ZITHROMAX) 250 MG Tab             Dx & d/c instructions discussed w/ patient and/or family members.     ER precautions (worsening signs symptoms and when to go to ER) discussed.    Follow up w/ PCP in 2-3 days to make sure symptoms improving and no further intervention/treatment and/or work-up needed was advised, ER if feeling worse or not improving in 2 days.    Possible side effects (i.e. Rash, GI upset/constipation, sedation, elevation of BP or sugars) of any medications given discussed.     Patient left in stable condition              "

## 2019-01-23 ENCOUNTER — OFFICE VISIT (OUTPATIENT)
Dept: MEDICAL GROUP | Facility: MEDICAL CENTER | Age: 59
End: 2019-01-23
Payer: COMMERCIAL

## 2019-01-23 VITALS
WEIGHT: 245 LBS | TEMPERATURE: 97.9 F | SYSTOLIC BLOOD PRESSURE: 140 MMHG | HEART RATE: 62 BPM | RESPIRATION RATE: 16 BRPM | BODY MASS INDEX: 40.82 KG/M2 | HEIGHT: 65 IN | OXYGEN SATURATION: 97 % | DIASTOLIC BLOOD PRESSURE: 100 MMHG

## 2019-01-23 DIAGNOSIS — I10 ESSENTIAL HYPERTENSION: ICD-10-CM

## 2019-01-23 DIAGNOSIS — Z00.00 ANNUAL PHYSICAL EXAM: ICD-10-CM

## 2019-01-23 DIAGNOSIS — R20.0 NUMBNESS AND TINGLING IN LEFT HAND: ICD-10-CM

## 2019-01-23 DIAGNOSIS — Z12.31 ENCOUNTER FOR SCREENING MAMMOGRAM FOR BREAST CANCER: ICD-10-CM

## 2019-01-23 DIAGNOSIS — Z13.21 ENCOUNTER FOR VITAMIN DEFICIENCY SCREENING: ICD-10-CM

## 2019-01-23 DIAGNOSIS — Z13.29 THYROID DISORDER SCREEN: ICD-10-CM

## 2019-01-23 DIAGNOSIS — R20.2 NUMBNESS AND TINGLING IN LEFT HAND: ICD-10-CM

## 2019-01-23 DIAGNOSIS — F32.A MILD EPISODE OF DEPRESSION: ICD-10-CM

## 2019-01-23 DIAGNOSIS — Z12.11 SCREENING FOR COLON CANCER: ICD-10-CM

## 2019-01-23 PROBLEM — E66.9 OBESITY (BMI 30-39.9): Status: RESOLVED | Noted: 2017-03-23 | Resolved: 2019-01-23

## 2019-01-23 PROBLEM — Z87.19 HISTORY OF DIVERTICULITIS: Status: ACTIVE | Noted: 2017-03-23

## 2019-01-23 PROCEDURE — 99214 OFFICE O/P EST MOD 30 MIN: CPT | Performed by: NURSE PRACTITIONER

## 2019-01-23 PROCEDURE — 99396 PREV VISIT EST AGE 40-64: CPT | Mod: 25 | Performed by: NURSE PRACTITIONER

## 2019-01-23 RX ORDER — BUPROPION HYDROCHLORIDE 150 MG/1
150 TABLET ORAL EVERY MORNING
Qty: 90 TAB | Refills: 1 | Status: ON HOLD | OUTPATIENT
Start: 2019-01-23 | End: 2019-03-18

## 2019-01-23 RX ORDER — METOPROLOL TARTRATE AND HYDROCHLOROTHIAZIDE 50; 25 MG/1; MG/1
1 TABLET ORAL DAILY
Qty: 90 TAB | Refills: 3 | Status: SHIPPED | OUTPATIENT
Start: 2019-01-23

## 2019-01-23 RX ORDER — BUPROPION HYDROCHLORIDE 300 MG/1
300 TABLET ORAL EVERY MORNING
Qty: 90 TAB | Refills: 3 | Status: ON HOLD | OUTPATIENT
Start: 2019-01-23 | End: 2019-03-18

## 2019-01-23 NOTE — PROGRESS NOTES
"Chief Complaint   Patient presents with   • Annual Exam   • Medication Refill     Charlene Min is a 58 y.o. female established patient here for annual exam and also with new concerns.  She is due for pelvic exam and declines Pap today.  Due for mammogram and colon cancer screening as well.  We discussed:    Essential hypertension  BP trending higher in home readings. 140/100 today in the office  No chest pain, dizziness, palpitation  She has been taking half a tab of her metoprolol hydrochlorothiazide    Numbness and tingling in left hand  New issue over the last few months but becoming more frequent .  Sometimes seems to be positional, resolves with shaking out her arm or changing positions.  Sometimes is painful \"feels like an open blister on my fingers\"  She denies any significant wrist, shoulder, or elbow pain.  No change in strength or range of motion  H/o cervical disc herniation, still having occasional left-sided neck pain which she is not noted whether or not her neck pain is worse when the tingling is present      Mild episode of depression  She continues with Wellbutrin 300 mg daily, has been struggling more with her mood in the last few months.  Feeling unmotivated, she is interested in increase in medication.  No SI/HI, manic behavior, insomnia, appetite changes    Current medicines (including changes today)  Current Outpatient Prescriptions   Medication Sig Dispense Refill   • buPROPion (WELLBUTRIN XL) 150 MG XL tablet Take 1 Tab by mouth every morning. 90 Tab 1   • buPROPion (WELLBUTRIN XL) 300 MG XL tablet Take 1 Tab by mouth every morning. 90 Tab 3   • metoprolol-hydrochlorothiazide (LOPRESSOR HCT) 50-25 MG per tablet Take 1 Tab by mouth every day. 90 Tab 3   • albuterol (PROAIR HFA) 108 (90 Base) MCG/ACT Aero Soln inhalation aerosol Inhale 2 Puffs by mouth every 6 hours as needed for Shortness of Breath. 8.5 g 3     No current facility-administered medications for this visit.  " "    She  has a past medical history of Hyperlipidemia and Hypertension. She also has no past medical history of Diabetes (HCC).  She  has a past surgical history that includes abdominal hysterectomy total and tonsillectomy.  Social History   Substance Use Topics   • Smoking status: Former Smoker     Years: 35.00     Types: Cigarettes   • Smokeless tobacco: Never Used   • Alcohol use No     Social History     Social History Narrative   • No narrative on file     Family History   Problem Relation Age of Onset   • Heart Disease Mother    • Hypertension Mother    • Heart Attack Mother    • Lung Disease Father    • Heart Disease Father    • Cancer Brother         colorectal     Family Status   Relation Status   • Mo    • Fa    • Bro Alive   • Bro Alive   • Bro Alive   • Bro          ROS  Problems listed discussed above, all other systems reviewed and negative     Objective:     Blood pressure 140/100, pulse 62, temperature 36.6 °C (97.9 °F), temperature source Temporal, resp. rate 16, height 1.651 m (5' 5\"), weight 111.1 kg (245 lb), SpO2 97 %. Body mass index is 40.77 kg/m².  Physical Exam:  General: Alert, oriented in no acute distress.  Eye contact is good, speech is normal, affect calm  HEENT: EOMI, perrl, Oral mucosa pink moist, no lesions. Nares patent. TMs gray with good landmarks bilaterally. No cervical or supraclavicular lymphadenopathy  Lungs: clear to auscultation bilaterally, good aeration, normal effort. No wheeze/ rhonchi/ rales.  CV: regular rate and rhythm, S1, S2. No murmur, no JVD, no edema. Pedal pulses 2 + bilaterally  Abdomen: soft, nontender, BS x4, no hepatosplenomegaly.  Ext: color normal, vascularity normal, temperature normal. No rash or lesions.  MS: no point tenderness over spine, no obvious deformity. No joint swelling or redness. Strength is 5/5 globally  Neuro: DTR 2+ bilaterally, negative romberg   Assessment and Plan:   The following treatment plan was " discussed  1. Annual physical exam  Lipid Profile   2. Essential hypertension   uncontrolled.  She will increase back to whole tablet her medication, continue to monitor blood pressure at home and contact me with readings  COMP METABOLIC PANEL    metoprolol-hydrochlorothiazide (LOPRESSOR HCT) 50-25 MG per tablet   3. Encounter for screening mammogram for breast cancer  MA-SCREEN MAMMO W/CAD-BILAT   4. Screening for colon cancer  COLOGUARD (FIT DNA)   5. Encounter for vitamin deficiency screening  VITAMIN D,25 HYDROXY   6. Thyroid disorder screen  TSH WITH REFLEX TO FT4   7. Mild episode of depression   uncontrolled, interested in medication increase.  We will going to 450 mg daily.  We have discussed that if this is ineffective we could consider addition of SSRI  buPROPion (WELLBUTRIN XL) 150 MG XL tablet    buPROPion (WELLBUTRIN XL) 300 MG XL tablet   8. Numbness and tingling in left hand   possibly related to her history of neck injury.  She is interested in trying physical therapy.  NSAID use discussed.  She will contact me if this is not improving with therapy at which point we will proceed with further evaluation  REFERRAL TO PHYSICAL THERAPY Reason for Therapy: Eval/Treat/Report       Educated in proper administration of medication(s) ordered today including safety, possible SE, risks, benefits, rationale and alternatives to therapy.     Followup: pending labs             Please note that this dictation was created using voice recognition software. I have worked with consultants from the vendor as well as technical experts from [employer] to optimize the interface. I have made every reasonable attempt to correct obvious errors, but I expect that there are errors of grammar and possibly content that I did not discover before finalizing the note.

## 2019-01-23 NOTE — ASSESSMENT & PLAN NOTE
She continues with Wellbutrin 300 mg daily, has been struggling more with her mood in the last few months.  Feeling unmotivated, she is interested in increase in medication.  No SI/HI, manic behavior, insomnia, appetite changes

## 2019-01-23 NOTE — ASSESSMENT & PLAN NOTE
BP trending higher in home readings. 140/100 today in the office  No chest pain, dizziness, palpitation  She has been taking half a tab of her metoprolol hydrochlorothiazide

## 2019-01-23 NOTE — ASSESSMENT & PLAN NOTE
"New issue over the last few months but becoming more frequent .  Sometimes seems to be positional, resolves with shaking out her arm or changing positions.  Sometimes is painful \"feels like an open blister on my fingers\"  She denies any significant wrist, shoulder, or elbow pain.  No change in strength or range of motion  H/o cervical disc herniation, still having occasional left-sided neck pain which she is not noted whether or not her neck pain is worse when the tingling is present    "

## 2019-02-21 ENCOUNTER — PHYSICAL THERAPY (OUTPATIENT)
Dept: PHYSICAL THERAPY | Facility: MEDICAL CENTER | Age: 59
End: 2019-02-21
Attending: NURSE PRACTITIONER
Payer: COMMERCIAL

## 2019-02-21 DIAGNOSIS — R20.2 NUMBNESS AND TINGLING IN LEFT HAND: ICD-10-CM

## 2019-02-21 DIAGNOSIS — R20.0 NUMBNESS AND TINGLING IN LEFT HAND: ICD-10-CM

## 2019-02-21 PROCEDURE — 97012 MECHANICAL TRACTION THERAPY: CPT

## 2019-02-21 PROCEDURE — 97162 PT EVAL MOD COMPLEX 30 MIN: CPT

## 2019-02-21 ASSESSMENT — ENCOUNTER SYMPTOMS
PAIN SCALE: 2
PAIN SCALE AT HIGHEST: 8
PAIN SCALE AT LOWEST: 0

## 2019-02-21 NOTE — OP THERAPY EVALUATION
"  Outpatient Physical Therapy  INITIAL EVALUATION    [facility]  [address]  [place] [address]  Phone:  [phone]  Fax:  [phone]    Date of Evaluation: 02/21/2019    Patient: Charlene Min  YOB: 1960  MRN: [id number]     Referring Provider: MARIA T Virgen  [address]  [address]  [place] [address]   Referring Diagnosis Numbness and tingling in left hand [R20.0, R20.2]     Time Calculation  Start time: 1100  Stop time: 1205 Time Calculation (min): 65 minutes     Physical Therapy Occurrence Codes    Date of onset of impairment:  1/23/19   Date physical therapy care plan established or reviewed:  2/21/19   Date physical therapy treatment started:  2/21/19          Chief Complaint: No chief complaint on file.    Visit Diagnoses     ICD-10-CM   1. Numbness and tingling in left hand R20.0    R20.2         Subjective:   History of Present Illness:     Mechanism of injury:  Pt \"Charlene\" states she started having numbness in her first two fingers, but now is more in all fingers. The numbness is in the tips of her fingers, rarely even gets to the second knuckle. She has history of herniated disc in her neck which has affected her left side. She has been sitting more and walking less which she thinks may be contributing. She drinks water and walks which helps. She is not sure what aggravates it, but does notice inactivity seems to aggravate it. She has paid attention to position and has not noticed much, but does have R sided neck pain right now. Her neck pain is not correlated with her numbness so is not sure if it is related. Her pain feels like a blister popped or like raw skin against sandpaper. She does not surgery so is trying PT to help first. 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 She is impacted at work because her sensation is impaired in those fingers. Has allergy to tapes, latex gloves (can " tolerate bands), seasonal allergies.   Pain:     Current pain ratin    At best pain ratin    At worst pain ratin  Diagnostic Tests:     Diagnostic Tests Comments:  No new imaging  Patient Goals:     Patient goals for therapy:  Increased strength, increased motion and decreased pain    Other patient goals:  Increased sensation      Past Medical History:   Diagnosis Date   • Hyperlipidemia    • Hypertension      Past Surgical History:   Procedure Laterality Date   • ABDOMINAL HYSTERECTOMY TOTAL     • TONSILLECTOMY       Social History   Substance Use Topics   • Smoking status: Former Smoker     Years: 35.00     Types: Cigarettes   • Smokeless tobacco: Never Used   • Alcohol use No     Family and Occupational History     Social History   • Marital status: Single     Spouse name: N/A   • Number of children: N/A   • Years of education: N/A       Objective     Postural Observations  Seated posture: fair    Additional Postural Observation Details  Forward head/rounded shoulders    Shoulder Screen    Shoulder active range of motion within functional limits.  Shoulder strength within functional limits.    Neurological Testing     Reflexes   Left   Ankle clonus reflex: negative  Varela's reflex: negative    Right   Ankle clonus reflex: negative  Varela's reflex: negative    Myotome testing   Cervical (left)   C4 (shoulder shrug): 5  C5 (deltoid): 5  C6 (biceps): 5  C7 (triceps): 5  C8 (thumb extension): 5  T1 (intrinsics): 5    Cervical (right)   C4 (shoulder shrug): 5  C5 (deltoid): 5  C6 (biceps): 5  C7 (triceps): 5  C8 (thumb extension): 5  T1 (intrinsics): 5    Additional Neurological Details  Light touch: WNL for B hands    Active Range of Motion     Cervical Spine   Flexion: within functional limits  Extension: within functional limits  Retraction: within functional limits  Left lateral flexion: within functional limits  Right lateral flexion: within functional limits  Left rotation: within functional  "limits  Right rotation: within functional limits    Joint Play   Spine     Central PA [place]        C0-1: hypomobile       C2: hypomobile       C3: WFL       C4: WFL       C5: WFL       C6: hypomobile       C7: hypomobile        Strength:      Left Wrist/Hand    (2nd hand position)     Trial 1: 60    Trial 2: 58    Trial 3: 59    Average: 58.67    Right Wrist/Hand    (2nd hand position)     Trial 1: 60    Trial 2: 59    Trial 3: 60    Average: 59.67    Additional Strength Details  Pinch test: R hand: 15lbs, 15lbs, 15lbs, Ave: 15lbs.  L hand: 15lbs, 15lbs, 15lbs Ave: 15lbs     Tests     Left Shoulder   Positive ULTT2.   Negative Spurling's sign, ULTT3 and ULTT4.     Right Shoulder   Negative Spurling's sign, ULTT2, ULTT3 and ULTT4.         Therapeutic Exercises (CPT 24539):     1. Rows, pink, 10 x 1    2. Scap + ER, pink, 10 x 1    3. Median n glide, 10 x 1    Therapeutic Treatments and Modalities:     1. Mechanical Traction (CPT 37407), 16/10lbs,  60/20\" x 15 min with P    Time-based treatments/modalities:  Therapeutic exercise minutes (CPT 36838): 5 minutes       Assessment, Response and Plan:   Impairments: abnormal muscle firing and difficulty performing job    Assessment details:  Pt is a pleasant, cooperative 57 yo female who presents with L finger numbness and tingling. The pt's symptoms were unable to be elicited with any cx ROM, carpal tunnel tests, or nerve glides. Pt does have scap weakness, fair posture, and some pain with median n test in the forearm so symptoms could be referring from tension on the median nerve glide or due to her previous herniated disc. Pt will benefit from skilled physical therapy to strengthen her scap and DNF musculature, decrease her neural tension, and decrease her L finger numbness and tingling in order to return to ADLs, work, and recreational activities with less pain and restriction.  Prognosis: fair    Goals:   Short Term Goals:   1. Pt to sit in 50% improvement " posture without cueing  2. Pt to have (-) L median nerve glide  3. Pt to perform HEP without cueing demonstrating compliance  Short term goal time span:  2-4 weeks      Long Term Goals:    1. Pt to have 50% improvement in frequency of symptoms  2. Pt to have <5/10 VAS severity in her symptoms at their worst  3. Pt to no longer need to reach out to co-workers to help with tasks that require good finger sensitivity  Long term goal time span:  6-8 weeks    Plan:   Therapy options:  Physical therapy treatment to continue  Planned therapy interventions:  E Stim Unattended (CPT 51713), Manual Therapy (CPT 16008), Mechanical Traction (CPT 38007), Neuromuscular Re-education (CPT 77556), Therapeutic Activities (CPT 98752) and Therapeutic Exercise (CPT 67505)  Frequency:  2x week  Duration in weeks:  8  Plan details:  Pt will be seen for 4-6 visits. If no improvement is made, pt will be referred back to physician for further assessment and possible imaging.      Functional Limitations and Severity Modifiers      Current:  na   Goal:  na     Referring provider co-signature:  I have reviewed this plan of care and my co-signature certifies the need for services.  Certification Dates:   From 02/21/19     To 04/18/19    Physician Signature: ________________________________ Date: ______________

## 2019-02-28 ENCOUNTER — APPOINTMENT (OUTPATIENT)
Dept: PHYSICAL THERAPY | Facility: MEDICAL CENTER | Age: 59
End: 2019-02-28
Attending: NURSE PRACTITIONER
Payer: COMMERCIAL

## 2019-02-28 NOTE — OP THERAPY DAILY TREATMENT
"  Outpatient Physical Therapy  DAILY TREATMENT     [facility] Outpatient Physical Therapy  [address]  [place] [address]  Phone:  [phone]  Fax:  [phone]    Date: 02/28/2019    Patient: Charlene Min  YOB: 1960  MRN: [id number]     Time Calculation             Chief Complaint: No chief complaint on file.    Visit #: 2    SUBJECTIVE:  ***    OBJECTIVE:  Current objective measures: ***          Therapeutic Exercises (CPT 30059):       Therapeutic Exercise Summary: Therapeutic Exercises (CPT 09816):     1. Rows, pink, 10 x 1    2. Scap + ER, pink, 10 x 1    3. Median n glide, 10 x 1  Therapeutic Treatments and Modalities:     1. Mechanical Traction (CPT 72995), 16/10lbs,  60/20\" x 15 min with MHP      Time-based treatments/modalities:          Pain rating before treatment: {PAIN NUMBERS_1-10:98538}  Pain rating after treatment: {PAIN NUMBERS_1-10:97657}    ASSESSMENT:   Response to treatment: ***    PLAN/RECOMMENDATIONS:   Plan for treatment: {AMB OP THERAPY - THERAPY PLAN:[id number]::\"therapy treatment to continue next visit\"}.  Planned interventions for next visit: {PT PLANNED THERAPY INTERVENTIONS:051214043}.      "

## 2019-03-07 ENCOUNTER — PHYSICAL THERAPY (OUTPATIENT)
Dept: PHYSICAL THERAPY | Facility: MEDICAL CENTER | Age: 59
End: 2019-03-07
Attending: NURSE PRACTITIONER
Payer: COMMERCIAL

## 2019-03-07 DIAGNOSIS — R20.0 NUMBNESS AND TINGLING IN LEFT HAND: ICD-10-CM

## 2019-03-07 DIAGNOSIS — R20.2 NUMBNESS AND TINGLING IN LEFT HAND: ICD-10-CM

## 2019-03-07 PROCEDURE — 97012 MECHANICAL TRACTION THERAPY: CPT

## 2019-03-07 PROCEDURE — 97140 MANUAL THERAPY 1/> REGIONS: CPT

## 2019-03-07 PROCEDURE — 97110 THERAPEUTIC EXERCISES: CPT

## 2019-03-07 NOTE — OP THERAPY DAILY TREATMENT
"  Outpatient Physical Therapy  DAILY TREATMENT     [facility] Outpatient Physical Therapy  [address]  [place] [address]  Phone:  [phone]  Fax:  [phone]    Date: 03/07/2019    Patient: Charlene Min  YOB: 1960  MRN: [id number]     Time Calculation  Start time: 1100  Stop time: 1150 Time Calculation (min): 50 minutes     Chief Complaint: Neck Problem and Other (L finger numbness/tingling)    Visit #: 2    SUBJECTIVE:  Pt states she did not have numbness into her finger tips until 2 days ago. She thinks the traction really helped.     OBJECTIVE:  Current objective measures:           Therapeutic Exercises (CPT 51416):     1. Rows, pink, 10 x 1    2. Shoulder ext, pink, 10 x 1    3. Box exercise, 10 x 1    4. Foam roll, shoulder flexion, 10 x 1, snow angels, 10 x 1, horiz abduction, 10 x 1    7. Cat/cow, 10 x 1    8. Doorway walk throug, 10 x 1    Therapeutic Treatments and Modalities:     1. Mechanical Traction (CPT 79776), 18/10lbs, 60/20\" x 15 min with MHP    2. Manual Therapy (CPT 21808), STM to B UT, 5 min. cx distraction, grade 3, 20\" x 6 (in neutral and rotation)    Time-based treatments/modalities:  Manual therapy minutes (CPT 45109): 10 minutes  Therapeutic exercise minutes (CPT 50162): 20 minutes       ASSESSMENT:   Response to treatment: Pt presents with L finger n/t. Pt had decreased symptoms following traction indicating possible cervical radiculopathy as a source of her symptoms. Pt able to perform scapular exercises without increase in symptoms, required cueing for decreased UT and maintaining chin tuck throughout.    PLAN/RECOMMENDATIONS:   Plan for treatment: therapy treatment to continue next visit.  Planned interventions for next visit: continue with current treatment. Progress scap. Repeat traction, try 20lbs.       "

## 2019-03-14 ENCOUNTER — APPOINTMENT (OUTPATIENT)
Dept: PHYSICAL THERAPY | Facility: MEDICAL CENTER | Age: 59
End: 2019-03-14
Attending: NURSE PRACTITIONER
Payer: COMMERCIAL

## 2019-03-17 ENCOUNTER — HOSPITAL ENCOUNTER (INPATIENT)
Facility: MEDICAL CENTER | Age: 59
LOS: 1 days | DRG: 918 | End: 2019-03-18
Attending: EMERGENCY MEDICINE | Admitting: HOSPITALIST
Payer: COMMERCIAL

## 2019-03-17 DIAGNOSIS — E86.0 DEHYDRATION: ICD-10-CM

## 2019-03-17 DIAGNOSIS — T44.7X2A SUICIDE ATTEMPT BY BETA BLOCKER OVERDOSE, INITIAL ENCOUNTER (HCC): ICD-10-CM

## 2019-03-17 DIAGNOSIS — I10 ESSENTIAL HYPERTENSION: ICD-10-CM

## 2019-03-17 DIAGNOSIS — R00.1 BRADYCARDIA, SINUS: ICD-10-CM

## 2019-03-17 DIAGNOSIS — E87.6 HYPOKALEMIA: ICD-10-CM

## 2019-03-17 DIAGNOSIS — I95.2 HYPOTENSION DUE TO DRUGS: ICD-10-CM

## 2019-03-17 DIAGNOSIS — R11.2 NON-INTRACTABLE VOMITING WITH NAUSEA, UNSPECIFIED VOMITING TYPE: ICD-10-CM

## 2019-03-17 PROCEDURE — 93005 ELECTROCARDIOGRAM TRACING: CPT | Performed by: EMERGENCY MEDICINE

## 2019-03-17 PROCEDURE — 99291 CRITICAL CARE FIRST HOUR: CPT

## 2019-03-17 RX ORDER — SODIUM CHLORIDE 9 MG/ML
1000 INJECTION, SOLUTION INTRAVENOUS ONCE
Status: COMPLETED | OUTPATIENT
Start: 2019-03-18 | End: 2019-03-18

## 2019-03-18 ENCOUNTER — APPOINTMENT (OUTPATIENT)
Dept: RADIOLOGY | Facility: MEDICAL CENTER | Age: 59
DRG: 918 | End: 2019-03-18
Attending: EMERGENCY MEDICINE
Payer: COMMERCIAL

## 2019-03-18 VITALS
HEIGHT: 65 IN | TEMPERATURE: 97.4 F | HEART RATE: 57 BPM | WEIGHT: 238.98 LBS | OXYGEN SATURATION: 98 % | SYSTOLIC BLOOD PRESSURE: 125 MMHG | DIASTOLIC BLOOD PRESSURE: 69 MMHG | RESPIRATION RATE: 22 BRPM | BODY MASS INDEX: 39.82 KG/M2

## 2019-03-18 PROBLEM — T44.7X2A INTENTIONAL OVERDOSE OF BETA-ADRENERGIC BLOCKING DRUG (HCC): Status: ACTIVE | Noted: 2019-03-18

## 2019-03-18 PROBLEM — Z72.0 TOBACCO ABUSE: Status: ACTIVE | Noted: 2019-03-18

## 2019-03-18 PROBLEM — F32.A DEPRESSION: Status: ACTIVE | Noted: 2019-03-18

## 2019-03-18 PROBLEM — E87.6 HYPOKALEMIA: Status: ACTIVE | Noted: 2019-03-18

## 2019-03-18 PROBLEM — T50.2X5A: Status: ACTIVE | Noted: 2019-03-18

## 2019-03-18 PROBLEM — T14.91XA SUICIDE ATTEMPT (HCC): Status: ACTIVE | Noted: 2019-03-18

## 2019-03-18 PROBLEM — I95.9 HYPOTENSION: Status: ACTIVE | Noted: 2019-03-18

## 2019-03-18 PROBLEM — I95.9 HYPOTENSION: Status: RESOLVED | Noted: 2019-03-18 | Resolved: 2019-03-18

## 2019-03-18 LAB
ALBUMIN SERPL BCP-MCNC: 3.9 G/DL (ref 3.2–4.9)
ALBUMIN/GLOB SERPL: 1.3 G/DL
ALP SERPL-CCNC: 62 U/L (ref 30–99)
ALT SERPL-CCNC: 15 U/L (ref 2–50)
AMPHETAMINES UR QL SCN: NEGATIVE
ANION GAP SERPL CALC-SCNC: 7 MMOL/L (ref 0–11.9)
APAP SERPL-MCNC: <10 UG/ML (ref 10–30)
APPEARANCE UR: ABNORMAL
AST SERPL-CCNC: 17 U/L (ref 12–45)
BACTERIA #/AREA URNS HPF: ABNORMAL /HPF
BARBITURATES UR QL SCN: NEGATIVE
BASOPHILS # BLD AUTO: 0.5 % (ref 0–1.8)
BASOPHILS # BLD: 0.06 K/UL (ref 0–0.12)
BENZODIAZ UR QL SCN: NEGATIVE
BILIRUB SERPL-MCNC: 0.9 MG/DL (ref 0.1–1.5)
BILIRUB UR QL STRIP.AUTO: NEGATIVE
BUN SERPL-MCNC: 11 MG/DL (ref 8–22)
CALCIUM SERPL-MCNC: 9 MG/DL (ref 8.4–10.2)
CHLORIDE SERPL-SCNC: 103 MMOL/L (ref 96–112)
CO2 SERPL-SCNC: 26 MMOL/L (ref 20–33)
COCAINE UR QL SCN: NEGATIVE
COLOR UR: YELLOW
CREAT SERPL-MCNC: 0.83 MG/DL (ref 0.5–1.4)
EOSINOPHIL # BLD AUTO: 0.11 K/UL (ref 0–0.51)
EOSINOPHIL NFR BLD: 1 % (ref 0–6.9)
EPI CELLS #/AREA URNS HPF: ABNORMAL /HPF
ERYTHROCYTE [DISTWIDTH] IN BLOOD BY AUTOMATED COUNT: 44.7 FL (ref 35.9–50)
ETHANOL BLD-MCNC: 0.01 G/DL
GLOBULIN SER CALC-MCNC: 3 G/DL (ref 1.9–3.5)
GLUCOSE SERPL-MCNC: 125 MG/DL (ref 65–99)
GLUCOSE UR STRIP.AUTO-MCNC: NEGATIVE MG/DL
HCT VFR BLD AUTO: 43.7 % (ref 37–47)
HGB BLD-MCNC: 14.9 G/DL (ref 12–16)
IMM GRANULOCYTES # BLD AUTO: 0.03 K/UL (ref 0–0.11)
IMM GRANULOCYTES NFR BLD AUTO: 0.3 % (ref 0–0.9)
KETONES UR STRIP.AUTO-MCNC: NEGATIVE MG/DL
LACTATE BLD-SCNC: 0.8 MMOL/L (ref 0.5–2)
LEUKOCYTE ESTERASE UR QL STRIP.AUTO: NEGATIVE
LIPASE SERPL-CCNC: 24 U/L (ref 7–58)
LYMPHOCYTES # BLD AUTO: 2.41 K/UL (ref 1–4.8)
LYMPHOCYTES NFR BLD: 21.4 % (ref 22–41)
MCH RBC QN AUTO: 31 PG (ref 27–33)
MCHC RBC AUTO-ENTMCNC: 34.1 G/DL (ref 33.6–35)
MCV RBC AUTO: 90.9 FL (ref 81.4–97.8)
MICRO URNS: ABNORMAL
MONOCYTES # BLD AUTO: 0.59 K/UL (ref 0–0.85)
MONOCYTES NFR BLD AUTO: 5.2 % (ref 0–13.4)
MUCOUS THREADS #/AREA URNS HPF: ABNORMAL /HPF
NEUTROPHILS # BLD AUTO: 8.06 K/UL (ref 2–7.15)
NEUTROPHILS NFR BLD: 71.6 % (ref 44–72)
NITRITE UR QL STRIP.AUTO: NEGATIVE
NRBC # BLD AUTO: 0 K/UL
NRBC BLD-RTO: 0 /100 WBC
OPIATES UR QL SCN: POSITIVE
PCP UR QL SCN: NEGATIVE
PH UR STRIP.AUTO: 6 [PH]
PLATELET # BLD AUTO: 366 K/UL (ref 164–446)
PMV BLD AUTO: 10.3 FL (ref 9–12.9)
POTASSIUM SERPL-SCNC: 3.1 MMOL/L (ref 3.6–5.5)
PROT SERPL-MCNC: 6.9 G/DL (ref 6–8.2)
PROT UR QL STRIP: 30 MG/DL
RBC # BLD AUTO: 4.81 M/UL (ref 4.2–5.4)
RBC # URNS HPF: ABNORMAL /HPF
RBC UR QL AUTO: NEGATIVE
SALICYLATES SERPL-MCNC: <4 MG/DL (ref 15–25)
SODIUM SERPL-SCNC: 136 MMOL/L (ref 135–145)
SP GR UR REFRACTOMETRY: 1.03
THC UR QL SCN: POSITIVE
TROPONIN I SERPL-MCNC: <0.02 NG/ML (ref 0–0.04)
UNIDENT CRYS URNS QL MICRO: ABNORMAL /HPF
WBC # BLD AUTO: 11.3 K/UL (ref 4.8–10.8)
WBC #/AREA URNS HPF: ABNORMAL /HPF

## 2019-03-18 PROCEDURE — 80305 DRUG TEST PRSMV DIR OPT OBS: CPT

## 2019-03-18 PROCEDURE — 80053 COMPREHEN METABOLIC PANEL: CPT

## 2019-03-18 PROCEDURE — 83690 ASSAY OF LIPASE: CPT

## 2019-03-18 PROCEDURE — 700111 HCHG RX REV CODE 636 W/ 250 OVERRIDE (IP)

## 2019-03-18 PROCEDURE — 84484 ASSAY OF TROPONIN QUANT: CPT

## 2019-03-18 PROCEDURE — 83605 ASSAY OF LACTIC ACID: CPT

## 2019-03-18 PROCEDURE — 71045 X-RAY EXAM CHEST 1 VIEW: CPT | Performed by: RADIOLOGY

## 2019-03-18 PROCEDURE — 96367 TX/PROPH/DG ADDL SEQ IV INF: CPT

## 2019-03-18 PROCEDURE — 700105 HCHG RX REV CODE 258: Performed by: HOSPITALIST

## 2019-03-18 PROCEDURE — 71045 X-RAY EXAM CHEST 1 VIEW: CPT

## 2019-03-18 PROCEDURE — 36415 COLL VENOUS BLD VENIPUNCTURE: CPT

## 2019-03-18 PROCEDURE — 85025 COMPLETE CBC W/AUTO DIFF WBC: CPT

## 2019-03-18 PROCEDURE — 700111 HCHG RX REV CODE 636 W/ 250 OVERRIDE (IP): Performed by: HOSPITALIST

## 2019-03-18 PROCEDURE — 304561 HCHG STAT O2

## 2019-03-18 PROCEDURE — 99239 HOSP IP/OBS DSCHRG MGMT >30: CPT | Performed by: INTERNAL MEDICINE

## 2019-03-18 PROCEDURE — 700102 HCHG RX REV CODE 250 W/ 637 OVERRIDE(OP): Performed by: INTERNAL MEDICINE

## 2019-03-18 PROCEDURE — 96376 TX/PRO/DX INJ SAME DRUG ADON: CPT

## 2019-03-18 PROCEDURE — A9270 NON-COVERED ITEM OR SERVICE: HCPCS | Performed by: INTERNAL MEDICINE

## 2019-03-18 PROCEDURE — 81001 URINALYSIS AUTO W/SCOPE: CPT

## 2019-03-18 PROCEDURE — 96375 TX/PRO/DX INJ NEW DRUG ADDON: CPT

## 2019-03-18 PROCEDURE — 770001 HCHG ROOM/CARE - MED/SURG/GYN PRIV*

## 2019-03-18 PROCEDURE — 80307 DRUG TEST PRSMV CHEM ANLYZR: CPT

## 2019-03-18 PROCEDURE — 700111 HCHG RX REV CODE 636 W/ 250 OVERRIDE (IP): Performed by: EMERGENCY MEDICINE

## 2019-03-18 PROCEDURE — 96365 THER/PROPH/DIAG IV INF INIT: CPT

## 2019-03-18 PROCEDURE — 700105 HCHG RX REV CODE 258: Performed by: EMERGENCY MEDICINE

## 2019-03-18 PROCEDURE — 99236 HOSP IP/OBS SAME DATE HI 85: CPT | Performed by: HOSPITALIST

## 2019-03-18 PROCEDURE — 99252 IP/OBS CONSLTJ NEW/EST SF 35: CPT | Performed by: PSYCHIATRY & NEUROLOGY

## 2019-03-18 RX ORDER — ONDANSETRON 2 MG/ML
INJECTION INTRAMUSCULAR; INTRAVENOUS
Status: COMPLETED
Start: 2019-03-18 | End: 2019-03-18

## 2019-03-18 RX ORDER — SODIUM CHLORIDE 9 MG/ML
500 INJECTION, SOLUTION INTRAVENOUS ONCE
Status: COMPLETED | OUTPATIENT
Start: 2019-03-18 | End: 2019-03-18

## 2019-03-18 RX ORDER — FLUVOXAMINE MALEATE 50 MG/1
50 TABLET, COATED ORAL EVERY EVENING
Status: DISCONTINUED | OUTPATIENT
Start: 2019-03-19 | End: 2019-03-18 | Stop reason: HOSPADM

## 2019-03-18 RX ORDER — ONDANSETRON 2 MG/ML
4 INJECTION INTRAMUSCULAR; INTRAVENOUS ONCE
Status: COMPLETED | OUTPATIENT
Start: 2019-03-18 | End: 2019-03-18

## 2019-03-18 RX ORDER — BUPROPION HYDROCHLORIDE 300 MG/1
300 TABLET ORAL EVERY MORNING
Status: DISCONTINUED | OUTPATIENT
Start: 2019-03-18 | End: 2019-03-18

## 2019-03-18 RX ORDER — ONDANSETRON 2 MG/ML
4 INJECTION INTRAMUSCULAR; INTRAVENOUS EVERY 4 HOURS PRN
Status: DISCONTINUED | OUTPATIENT
Start: 2019-03-18 | End: 2019-03-18 | Stop reason: HOSPADM

## 2019-03-18 RX ORDER — BISACODYL 10 MG
10 SUPPOSITORY, RECTAL RECTAL
Status: DISCONTINUED | OUTPATIENT
Start: 2019-03-18 | End: 2019-03-18 | Stop reason: HOSPADM

## 2019-03-18 RX ORDER — CALCIUM CHLORIDE 100 MG/ML
INJECTION INTRAVENOUS; INTRAVENTRICULAR
Status: DISCONTINUED
Start: 2019-03-18 | End: 2019-03-18 | Stop reason: HOSPADM

## 2019-03-18 RX ORDER — BUPROPION HYDROCHLORIDE 150 MG/1
150 TABLET, EXTENDED RELEASE ORAL 2 TIMES DAILY
Status: DISCONTINUED | OUTPATIENT
Start: 2019-03-19 | End: 2019-03-18 | Stop reason: HOSPADM

## 2019-03-18 RX ORDER — POTASSIUM CHLORIDE 7.45 MG/ML
10 INJECTION INTRAVENOUS ONCE
Status: COMPLETED | OUTPATIENT
Start: 2019-03-18 | End: 2019-03-18

## 2019-03-18 RX ORDER — BUPROPION HYDROCHLORIDE 100 MG/1
200 TABLET, EXTENDED RELEASE ORAL 2 TIMES DAILY
Status: DISCONTINUED | OUTPATIENT
Start: 2019-03-18 | End: 2019-03-18

## 2019-03-18 RX ORDER — ONDANSETRON 4 MG/1
4 TABLET, ORALLY DISINTEGRATING ORAL EVERY 4 HOURS PRN
Status: DISCONTINUED | OUTPATIENT
Start: 2019-03-18 | End: 2019-03-18 | Stop reason: HOSPADM

## 2019-03-18 RX ORDER — POLYETHYLENE GLYCOL 3350 17 G/17G
1 POWDER, FOR SOLUTION ORAL
Status: DISCONTINUED | OUTPATIENT
Start: 2019-03-18 | End: 2019-03-18 | Stop reason: HOSPADM

## 2019-03-18 RX ORDER — PROMETHAZINE HYDROCHLORIDE 25 MG/1
12.5-25 TABLET ORAL EVERY 4 HOURS PRN
Status: DISCONTINUED | OUTPATIENT
Start: 2019-03-18 | End: 2019-03-18 | Stop reason: HOSPADM

## 2019-03-18 RX ORDER — SODIUM CHLORIDE 9 MG/ML
INJECTION, SOLUTION INTRAVENOUS CONTINUOUS
Status: DISCONTINUED | OUTPATIENT
Start: 2019-03-18 | End: 2019-03-18 | Stop reason: HOSPADM

## 2019-03-18 RX ORDER — BUPROPION HYDROCHLORIDE 150 MG/1
150 TABLET, EXTENDED RELEASE ORAL 2 TIMES DAILY
Status: DISCONTINUED | OUTPATIENT
Start: 2019-03-18 | End: 2019-03-18

## 2019-03-18 RX ORDER — BUPROPION HYDROCHLORIDE 100 MG/1
200 TABLET ORAL 2 TIMES DAILY
Status: DISCONTINUED | OUTPATIENT
Start: 2019-03-18 | End: 2019-03-18

## 2019-03-18 RX ORDER — POTASSIUM CHLORIDE 20 MEQ/1
40 TABLET, EXTENDED RELEASE ORAL ONCE
Status: COMPLETED | OUTPATIENT
Start: 2019-03-18 | End: 2019-03-18

## 2019-03-18 RX ORDER — SODIUM CHLORIDE 9 MG/ML
1000 INJECTION, SOLUTION INTRAVENOUS ONCE
Status: COMPLETED | OUTPATIENT
Start: 2019-03-18 | End: 2019-03-18

## 2019-03-18 RX ORDER — PROMETHAZINE HYDROCHLORIDE 25 MG/1
12.5-25 SUPPOSITORY RECTAL EVERY 4 HOURS PRN
Status: DISCONTINUED | OUTPATIENT
Start: 2019-03-18 | End: 2019-03-18 | Stop reason: HOSPADM

## 2019-03-18 RX ORDER — HEPARIN SODIUM 5000 [USP'U]/ML
5000 INJECTION, SOLUTION INTRAVENOUS; SUBCUTANEOUS EVERY 8 HOURS
Status: DISCONTINUED | OUTPATIENT
Start: 2019-03-18 | End: 2019-03-18 | Stop reason: HOSPADM

## 2019-03-18 RX ORDER — CALCIUM CHLORIDE 100 MG/ML
2 INJECTION INTRAVENOUS; INTRAVENTRICULAR ONCE
Status: DISCONTINUED | OUTPATIENT
Start: 2019-03-18 | End: 2019-03-18

## 2019-03-18 RX ORDER — AMOXICILLIN 250 MG
2 CAPSULE ORAL 2 TIMES DAILY
Status: DISCONTINUED | OUTPATIENT
Start: 2019-03-18 | End: 2019-03-18 | Stop reason: HOSPADM

## 2019-03-18 RX ADMIN — ONDANSETRON 4 MG: 2 INJECTION INTRAMUSCULAR; INTRAVENOUS at 02:23

## 2019-03-18 RX ADMIN — POTASSIUM CHLORIDE 40 MEQ: 1500 TABLET, EXTENDED RELEASE ORAL at 11:50

## 2019-03-18 RX ADMIN — SODIUM CHLORIDE 1000 ML: 9 INJECTION, SOLUTION INTRAVENOUS at 00:47

## 2019-03-18 RX ADMIN — BUPROPION HYDROCHLORIDE 200 MG: 100 TABLET, FILM COATED, EXTENDED RELEASE ORAL at 09:43

## 2019-03-18 RX ADMIN — ONDANSETRON 4 MG: 2 INJECTION INTRAMUSCULAR; INTRAVENOUS at 00:24

## 2019-03-18 RX ADMIN — SODIUM CHLORIDE 500 ML: 9 INJECTION, SOLUTION INTRAVENOUS at 04:34

## 2019-03-18 RX ADMIN — POTASSIUM CHLORIDE 10 MEQ: 7.46 INJECTION, SOLUTION INTRAVENOUS at 01:21

## 2019-03-18 RX ADMIN — GLUCAGON HYDROCHLORIDE 3 MG: KIT at 02:00

## 2019-03-18 RX ADMIN — SODIUM CHLORIDE: 9 INJECTION, SOLUTION INTRAVENOUS at 03:52

## 2019-03-18 RX ADMIN — HEPARIN SODIUM 5000 UNITS: 5000 INJECTION, SOLUTION INTRAVENOUS; SUBCUTANEOUS at 05:19

## 2019-03-18 RX ADMIN — SODIUM CHLORIDE 1000 ML: 9 INJECTION, SOLUTION INTRAVENOUS at 00:18

## 2019-03-18 RX ADMIN — CALCIUM CHLORIDE 2 G: 100 INJECTION INTRAVENOUS at 02:30

## 2019-03-18 RX ADMIN — GLUCAGON HYDROCHLORIDE 5 MG: KIT at 00:30

## 2019-03-18 ASSESSMENT — ENCOUNTER SYMPTOMS
PALPITATIONS: 0
NAUSEA: 1
EYE DISCHARGE: 0
SENSORY CHANGE: 0
FEVER: 0
SPEECH CHANGE: 0
DOUBLE VISION: 0
WEAKNESS: 1
DEPRESSION: 1
HEMOPTYSIS: 0
VOMITING: 1
ABDOMINAL PAIN: 0
DIZZINESS: 1
BRUISES/BLEEDS EASILY: 0
FLANK PAIN: 0
BLURRED VISION: 0
HALLUCINATIONS: 0
MYALGIAS: 0
FOCAL WEAKNESS: 0
COUGH: 0
HEARTBURN: 0
SHORTNESS OF BREATH: 0
CHILLS: 0

## 2019-03-18 ASSESSMENT — COGNITIVE AND FUNCTIONAL STATUS - GENERAL
SUGGESTED CMS G CODE MODIFIER DAILY ACTIVITY: CH
SUGGESTED CMS G CODE MODIFIER MOBILITY: CH
MOBILITY SCORE: 24
DAILY ACTIVITIY SCORE: 24

## 2019-03-18 ASSESSMENT — LIFESTYLE VARIABLES
SUBSTANCE_ABUSE: 0
DO YOU DRINK ALCOHOL: NO
EVER_SMOKED: YES
ALCOHOL_USE: NO

## 2019-03-18 ASSESSMENT — COPD QUESTIONNAIRES
HAVE YOU SMOKED AT LEAST 100 CIGARETTES IN YOUR ENTIRE LIFE: YES
IN THE PAST 12 MONTHS DO YOU DO LESS THAN YOU USED TO BECAUSE OF YOUR BREATHING PROBLEMS: DISAGREE/UNSURE
COPD SCREENING SCORE: 3
DO YOU EVER COUGH UP ANY MUCUS OR PHLEGM?: NO/ONLY WITH OCCASIONAL COLDS OR INFECTIONS
DURING THE PAST 4 WEEKS HOW MUCH DID YOU FEEL SHORT OF BREATH: NONE/LITTLE OF THE TIME

## 2019-03-18 NOTE — ED NOTES
1:1 observation taken over by CNA.  Patient is sitting in chair in room, with continued monitoring.

## 2019-03-18 NOTE — DISCHARGE INSTRUCTIONS
Discharge Instructions    Discharged to other by medical transportation with escort. Discharged via wheelchair, hospital escort: Yes.  Special equipment needed: Not Applicable    Be sure to schedule a follow-up appointment with your primary care doctor or any specialists as instructed.     Discharge Plan:   Diet Plan: Discussed  Activity Level: Discussed  Smoking Cessation Offered: Patient Refused  Confirmed Follow up Appointment: Patient to Call and Schedule Appointment  Confirmed Symptoms Management: Discussed  Medication Reconciliation Updated: Yes  Pneumococcal Vaccine Administered/Refused: Not given - Patient refused pneumococcal vaccine  Influenza Vaccine Indication: Not indicated: Previously immunized this influenza season and > 8 years of age    I understand that a diet low in cholesterol, fat, and sodium is recommended for good health. Unless I have been given specific instructions below for another diet, I accept this instruction as my diet prescription.   Other diet: Heart Healthy    Special Instructions: None    · Is patient discharged on Warfarin / Coumadin?   No     Depression / Suicide Risk    As you are discharged from this [facility] facility, it is important to learn how to keep safe from harming yourself.    Recognize the warning signs:  · Abrupt changes in personality, positive or negative- including increase in energy   · Giving away possessions  · Change in eating patterns- significant weight changes-  positive or negative  · Change in sleeping patterns- unable to sleep or sleeping all the time   · Unwillingness or inability to communicate  · Depression  · Unusual sadness, discouragement and loneliness  · Talk of wanting to die  · Neglect of personal appearance   · Rebelliousness- reckless behavior  · Withdrawal from people/activities they love  · Confusion- inability to concentrate     If you or a loved one observes any of these behaviors or has concerns about self-harm, here's what you can  do:  · Talk about it- your feelings and reasons for harming yourself  · Remove any means that you might use to hurt yourself (examples: pills, rope, extension cords, firearm)  · Get professional help from the community (Mental Health, Substance Abuse, psychological counseling)  · Do not be alone:Call your Safe Contact- someone whom you trust who will be there for you.  · Call your local CRISIS HOTLINE 150-7047 or 467-870-4668  · Call your local Children's Mobile Crisis Response Team [place] (471) 843-1129 or www.[employer]  · Call the toll free National Suicide Prevention Hotlines   · National Suicide Prevention Lifeline 473-191-MJGD (8053)  · National Hope Line Network 800-SUICIDE (560-4551)

## 2019-03-18 NOTE — PROGRESS NOTES
Report received. Assumed care of patient. Sitter at bedside. Patient is resting in bed with no complaints at this time. Patient assisted to commode with mild dizziness noted. Patient calm and coroperative. All needs are currently met. All safety precautions in place. Will continue to monitor.

## 2019-03-18 NOTE — DISCHARGE SUMMARY
Discharge Summary    CHIEF COMPLAINT ON ADMISSION  Chief Complaint   Patient presents with   • Suicidal Ideation     Patient reports attempting to overdose on rx medications. about 10 lopressor HTC, 60ml Tussinex, welbutrin x1   • N/V     states after taking the medications       Reason for Admission  dizzy n/v   Status post suicide attempt    CODE STATUS  Full Code    HPI & HOSPITAL COURSE  This is a 58 y.o. female here with with history of depression, hypertension was admitted yesterday on 3/17/2019 with nausea and vomiting after overdosing on multiple medications.  The patient reportedly took about 10 tablets of metoprolol/hydrochlorothiazide 50/25 and about 60 mL of Tussionex and Wellbutrin x1.  The patient slept for about 12 hours after she took these medications and she woke up feeling nauseous and dizzy and she came to the emergency room    In the ER poison control service was consulted.  The patient was given multiple doses of glucagon.  She was admitted to the ICU.  His stay in the ICU was uneventful overnight.  She was mildly bradycardic but asymptomatic with heart rate ranging in the 50s.  She was not hypotensive.  In the morning of 3/18 2019 the patient was seen by psychiatry and it was recommended for her to have inpatient psychiatric treatment for suicidal thoughts and depression.  The patient was able to tolerate food she was not hypotensive or bradycardic prior to discharge.  She has good urine output.  And she has acceptable labs before discharge.  Mild hypokalemia of 3.1 was replaced with 40 mEq potassium p.o. prior to discharge.      The patient can resume her blood pressure medications tomorrow if she continued to be not hypotensive and not bradycardic.    Psychiatric medications will be resumed by the psychiatry service.  Therefore, she is discharged in fair and stable condition to a psychiatric hospital.    The patient recovered much more quickly than anticipated on admission.      FOLLOW UP  ITEMS POST DISCHARGE  The patient will follow up with her primary care physician after discharge from the psychiatric hospital.    DISCHARGE DIAGNOSES  Principal Problem:    Intentional overdose of beta-adrenergic blocking drug (HCC) POA: Unknown  Active Problems:    Suicide attempt (HCC) POA: Unknown    Hypokalemia POA: Unknown    Depression POA: Unknown    Tobacco abuse POA: Unknown  Resolved Problems:    Hypotension POA: Unknown      FOLLOW UP  No future appointments.  No follow-up provider specified.    MEDICATIONS ON DISCHARGE     Medication List      CONTINUE taking these medications      Instructions   albuterol 108 (90 Base) MCG/ACT Aers inhalation aerosol  Commonly known as:  PROAIR HFA   Inhale 2 Puffs by mouth every 6 hours as needed for Shortness of Breath.  Dose:  2 Puff     metoprolol-hydrochlorothiazide 50-25 MG per tablet  Commonly known as:  LOPRESSOR HCT   Take 1 Tab by mouth every day.  Dose:  1 Tab        STOP taking these medications    buPROPion 150 MG XL tablet  Commonly known as:  WELLBUTRIN XL     buPROPion 300 MG XL tablet  Commonly known as:  WELLBUTRIN XL            Allergies  Allergies   Allergen Reactions   • Pcn [Penicillins]        DIET  Orders Placed This Encounter   Procedures   • Diet Order Regular (No Sharps)     Paperware only on meal tray.     Standing Status:   Standing     Number of Occurrences:   1     Order Specific Question:   Diet:     Answer:   Regular [1]     Comments:   No Sharps       ACTIVITY  As tolerated.  Weight bearing as tolerated    LINES, DRAINS, AND WOUNDS  This is an automated list. Peripheral IVs will be removed prior to discharge.                     MENTAL STATUS ON TRANSFER  Level of Consciousness: Alert  Orientation : Oriented x 4  Speech: Speech Clear    CONSULTATIONS  Psychiatry    PROCEDURES  None    LABORATORY  Lab Results   Component Value Date    SODIUM 136 03/18/2019    POTASSIUM 3.1 (L) 03/18/2019    CHLORIDE 103 03/18/2019    CO2 26 03/18/2019     GLUCOSE 125 (H) 03/18/2019    BUN 11 03/18/2019    CREATININE 0.83 03/18/2019        Lab Results   Component Value Date    WBC 11.3 (H) 03/18/2019    HEMOGLOBIN 14.9 03/18/2019    HEMATOCRIT 43.7 03/18/2019    PLATELETCT 366 03/18/2019        Total time of the discharge process exceeds 30minutes.

## 2019-03-18 NOTE — ASSESSMENT & PLAN NOTE
Overdose on metoprolol/hctz 25/50 10 tablets at 9 am 3/17/2019   Cont cardiac monitoring   PRN glucagon as needed ordered for HR <50   Cont with IVF  IV calcium given   If no improvement may need glucagon ggt, or possible Insulin/dextrose   Mildly symptomatic at this time with dizziness, headache. No syncope alert and oriented.   Will monitor in ICU for now given poison control reccomendations   Case #[id number]

## 2019-03-18 NOTE — PROGRESS NOTES
Paged Dr. Jeronimo at 0172 regarding low blood pressure and heart rate. Orders for NS bolus and to continue glucagon IV every hour as needed for heart rate below 50.

## 2019-03-18 NOTE — DISCHARGE PLANNING
SW notified that this patient has been accepted at [facility].  SW completed transportation and [facility] PCS form and transportation for to [facility] for transportation follow up.

## 2019-03-18 NOTE — PROGRESS NOTES
2 Rn skin check done with Keanu LOPEZ, skin WDL. No open areas noted on skin. Heels and elbow dry.

## 2019-03-18 NOTE — ED NOTES
Pt gave MD permission to speak to her brother about POC  He has been made aware  Pt's brother Teo phone number [phone]

## 2019-03-18 NOTE — ED TRIAGE NOTES
"Chief Complaint   Patient presents with   • Suicidal Ideation     Patient reports attempting to overdose on rx medications. about 10 lopressor HTC, 60ml Tussinex, welbutrin x1   • N/V     states after taking the medications     /69   Pulse (!) 53   Temp 36.1 °C (96.9 °F) (Temporal)   Resp 20   Ht 1.651 m (5' 5\")   Wt 108.4 kg (238 lb 15.7 oz)   SpO2 94%   BMI 39.77 kg/m²     Patient BIB Brother, Teo, with C/O N/V.  When roomed patient informed this nurse that she attempted suicide by overdosing on her prescribed medications approximately 12 hours ago.  Charge nurse notified.  "

## 2019-03-18 NOTE — PSYCHIATRY
BRIEF PSYCHIATRIC CONSULT NOTE: patient seen, full note to follow.  -Legal Hold:extended  -Sitter:yes  -Restrictions:   -phone:no personal phone, can have access to hospital phone   -visitors:no   -personal items: no   -finger foods required: yes   -personal/undergarments clothes:no   -medical bed:yes   -Orders Placed: routine  -Assessment:  -Plan:continue to follow

## 2019-03-18 NOTE — CARE PLAN
Problem: Safety  Goal: Will remain free from injury  Outcome: PROGRESSING AS EXPECTED  Patient has sitter at bedside and has been educated on use of call light

## 2019-03-18 NOTE — DISCHARGE PLANNING
SHERLEY received IP consult for Legal hold placed on this patient.  SHERLEY faxed completed legal hold to VALERIE [facility] for placement referral follow up.

## 2019-03-18 NOTE — ASSESSMENT & PLAN NOTE
Mildly symptomatic, patient alert and oriented   Due to medication overdose  Hold home BP medications   IVF for now   Monitor closely

## 2019-03-18 NOTE — DISCHARGE PLANNING
Received Transport Form @ 3510  Spoke to Vicente REYNOLDS    Transport is scheduled for 3/18 @1400 going to [facility].

## 2019-03-18 NOTE — CARE PLAN
Problem: Communication  Goal: The ability to communicate needs accurately and effectively will improve  Patient is calling appropriately for any and all needs. Sitter at bedside.     Problem: Knowledge Deficit  Goal: Knowledge of disease process/condition, treatment plan, diagnostic tests, and medications will improve  POC discussed with patient. All questions answered. Patient verbalized understanding.

## 2019-03-18 NOTE — DISCHARGE PLANNING
Received Choice form at [phone]  Agency/Facility Name: [facility], [facility]  Referral sent per Choice form @ [phone]

## 2019-03-18 NOTE — PSYCHIATRY
"PSYCHIATRIC CONSULTATION:  *Reason for admission:suicide attempt by OD on medications  *Reason for consult:: SA by OD  *Requesting Physician:Sunny Jasmine MD          Legal status:  on hold    *Chief Complaint: \"I overdosed due to financial difficulties\"     HPI: Pt says that she has been having financial difficulties for the past 1 1/2 year, which has caused her depression and stress. Patient says that at the time she was started on Wellbutrin, and that 2 months ago it was increased from 300 to 450mg. Patient says that today she is being locked out from her home and officially becoming homeless. She says that 1 week ago she started to have SI, and that 12h prior to the SA, she decided that she was going to OD on medications. She took her regular 450 mg dose of Wellbutrin, and took 2 pills of Lopresor every few minutes. She took a total of approximately 20 pills of Lopressor. She also took 2 opioid pills that she had at home, and finished a bottle of Tussinex. She went to bed and spet for about 10 hours. She later woke up vomiting, and realized her suicide attempt had failed. She told her her brother who lives with her, and asked him to bring her to the hospital. Pt does not know if she regrets her suicide attempt and stated when asked if she still has SI \"I don't know. The circumstances haven't changed\". Pt feels helpless and hopeless, but does not know if she feels worthless. She  Denies any change in sleep (sleeps about 5-7 hours), but noted decreased in her appetite and possibly has lost some weight. She reports some anhedonia and decreased in energy sometimes. She denies any guilty feeling, difficult with concentration, psychomotor changes. Pt denies any hx of signs of symptoms of serge, or psychosis. She denies feeling anxious, however reported hx of obsession with gambling, which stated about 5 years ago. \"I am obsessed, it is insane\". She says her gambling has gotten worse int he past  Year or so despite " of her financial burden, and was hoping that somehow the gambling could alleviate her financial status. She spends about 2 days or more of the week gambling, about 8h at a day. Pt reports no family or social support. She has no friends in [place]. One of her brothers live with her but can't provide financially. Her other brother is homeless in [place] and the other one in [place] has a wife in \[facility]\"" and can't help her either. 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 She denied missing any days of work this past 2 weeks. Pt denies using marijauna and stated that her UDS is positive likely from second hand from her brother who lives with her. She denied using any alcohol for the past 20 years. She denied using any other drugs.       Psychiatric Review of Systems:current symptoms as reported by pt.  Depression:   Depressed mood with some anhedonia, decreased in appetite and energy     Anushka:denies  Anxiety/Panic Attacks denies  PTSD symptom: denies  Psychosis:denies         Medical Review of Systems: nausea, denies CP, SOB  Neurological:    TBIs:denies   SZs:denies   Strokes:denies   Other:  Other medical symptoms:   Thyroid:denies   Diabetes:denies   Cardiovascular disease:HTN    Psychiatric Examination: observed phenomenon:  Vitals:  Vitals:    03/18/19 0800   BP:    Pulse: (!) 59   Resp: (!) 37   Temp:    SpO2: 90%       Appearance:obese  woman, wearing hospital gown and glasses, with fair grooming and hygiene  Behavior: calm and cooperative  Avoidant eye contact  Muscle Strength/Tone:no psychomotor retardation or agitation  Gait/Station:not tested  Speech:normal volume, tone and rhythm  Thought Process:linear and goal oriented  Associations:none  Abnormal/Psychotic Thoughts (ex):denies any AVH, no delusions or paranoia elicited  Insight/Judgement:fair/limited  Orientation:grossly  "oriented  Memory:intact  Attention/Concentration:intact  Language:fluent in English   Mood:\"depressed\"       Affect: depressed    SI/HI:questinable SI, denies HI  Neurological Testing:( ie clock, cube drawing, MMSE, MOCA,etc.): n/a     Past Psychiatric Hx:  with hx of unspecified depressive disorder and gambling addiction, on wellbutrin, not connected to outpatient psychiatric services, no hx of inpatient psychiatric hospitalizations, no hz of SA/SIB. Pt has a PCP Dr Iqra Benavides at [facility] (she prescribed pt Wellbutrin)     Family Psychiatric Hx:cousin committed suicide, no other hx    Social Hx:homeless as per today, nurse, single, no children, born and raised in [facility], moved to [place] about 3 years ago.     Drug/Alcohol/Tobacco Hx:   Drugs:denies (see HPi), says that she had prescribed opioid left over at home, but denied any abuse or being currently on opioid.     Alcohol:denies   Tobacco:1 pack every week    Medical Hx: labs, MARS, medications, etc were reviewed. Only those findings of potential interest to psychiatry are noted below:  Medical Conditions:    Past Medical History:   Diagnosis Date   • Hyperlipidemia    • Hypertension      Allergies: Pcn [penicillins]  Medications (currently prescribed at [facility]):  Current Facility-Administered Medications   Medication Dose Route Frequency Provider Last Rate Last Dose   • senna-docusate (PERICOLACE or SENOKOT S) 8.6-50 MG per tablet 2 Tab  2 Tab Oral BID Sunny Jasmine M.D.        And   • polyethylene glycol/lytes (MIRALAX) PACKET 1 Packet  1 Packet Oral QDAY PRN Sunny Jasmine M.D.        And   • magnesium hydroxide (MILK OF MAGNESIA) suspension 30 mL  30 mL Oral QDAY PRN Sunny Jasmine M.D.        And   • bisacodyl (DULCOLAX) suppository 10 mg  10 mg Rectal QDAY PRN Sunny Jasmine M.D.       • NS infusion   Intravenous Continuous Sunny Jasmine M.D. 150 mL/hr at 03/18/19 0352     • heparin injection 5,000 Units  5,000 Units Subcutaneous Q8HRS " Sunny Jasmine M.D.   5,000 Units at 03/18/19 0519   • ondansetron (ZOFRAN) syringe/vial injection 4 mg  4 mg Intravenous Q4HRS PRKIESHA Jasmine M.D.   4 mg at 03/18/19 0223   • ondansetron (ZOFRAN ODT) dispertab 4 mg  4 mg Oral Q4HRS PRN Sunny Jasmine M.D.       • promethazine (PHENERGAN) tablet 12.5-25 mg  12.5-25 mg Oral Q4HRS PRN Sunny Jasmine M.D.       • promethazine (PHENERGAN) suppository 12.5-25 mg  12.5-25 mg Rectal Q4HRS PRN Sunny Jasmine M.D.       • prochlorperazine (COMPAZINE) injection 5-10 mg  5-10 mg Intravenous Q4HRS PRN Sunny Jasmine M.D.       • glucagon injection 3 mg  3 mg Intravenous Q HOUR PRN Sunny Jasmine M.D.       • buPROPion SR (WELLBUTRIN-SR) tablet 200 mg  200 mg Oral BID Toni Mccoy M.D.   200 mg at 03/18/19 0943       Labs:   Recent Results (from the past 48 hour(s))   CBC WITH DIFFERENTIAL    Collection Time: 03/18/19 12:12 AM   Result Value Ref Range    WBC 11.3 (H) 4.8 - 10.8 K/uL    RBC 4.81 4.20 - 5.40 M/uL    Hemoglobin 14.9 12.0 - 16.0 g/dL    Hematocrit 43.7 37.0 - 47.0 %    MCV 90.9 81.4 - 97.8 fL    MCH 31.0 27.0 - 33.0 pg    MCHC 34.1 33.6 - 35.0 g/dL    RDW 44.7 35.9 - 50.0 fL    Platelet Count 366 164 - 446 K/uL    MPV 10.3 9.0 - 12.9 fL    Neutrophils-Polys 71.60 44.00 - 72.00 %    Lymphocytes 21.40 (L) 22.00 - 41.00 %    Monocytes 5.20 0.00 - 13.40 %    Eosinophils 1.00 0.00 - 6.90 %    Basophils 0.50 0.00 - 1.80 %    Immature Granulocytes 0.30 0.00 - 0.90 %    Nucleated RBC 0.00 /100 WBC    Neutrophils (Absolute) 8.06 (H) 2.00 - 7.15 K/uL    Lymphs (Absolute) 2.41 1.00 - 4.80 K/uL    Monos (Absolute) 0.59 0.00 - 0.85 K/uL    Eos (Absolute) 0.11 0.00 - 0.51 K/uL    Baso (Absolute) 0.06 0.00 - 0.12 K/uL    Immature Granulocytes (abs) 0.03 0.00 - 0.11 K/uL    NRBC (Absolute) 0.00 K/uL   COMP METABOLIC PANEL    Collection Time: 03/18/19 12:12 AM   Result Value Ref Range    Sodium 136 135 - 145 mmol/L    Potassium 3.1 (L) 3.6 - 5.5 mmol/L     Chloride 103 96 - 112 mmol/L    Co2 26 20 - 33 mmol/L    Anion Gap 7.0 0.0 - 11.9    Glucose 125 (H) 65 - 99 mg/dL    Bun 11 8 - 22 mg/dL    Creatinine 0.83 0.50 - 1.40 mg/dL    Calcium 9.0 8.4 - 10.2 mg/dL    AST(SGOT) 17 12 - 45 U/L    ALT(SGPT) 15 2 - 50 U/L    Alkaline Phosphatase 62 30 - 99 U/L    Total Bilirubin 0.9 0.1 - 1.5 mg/dL    Albumin 3.9 3.2 - 4.9 g/dL    Total Protein 6.9 6.0 - 8.2 g/dL    Globulin 3.0 1.9 - 3.5 g/dL    A-G Ratio 1.3 g/dL   LIPASE    Collection Time: 03/18/19 12:12 AM   Result Value Ref Range    Lipase 24 7 - 58 U/L   TROPONIN    Collection Time: 03/18/19 12:12 AM   Result Value Ref Range    Troponin I <0.02 0.00 - 0.04 ng/mL   LACTIC ACID    Collection Time: 03/18/19 12:12 AM   Result Value Ref Range    Lactic Acid 0.8 0.5 - 2.0 mmol/L   DIAGNOSTIC ALCOHOL    Collection Time: 03/18/19 12:12 AM   Result Value Ref Range    Diagnostic Alcohol 0.01 (H) 0.00 g/dL   ACETAMINOPHEN    Collection Time: 03/18/19 12:12 AM   Result Value Ref Range    Acetaminophen -Tylenol <10 10 - 30 ug/mL   Salicylate    Collection Time: 03/18/19 12:12 AM   Result Value Ref Range    Salicylates, Quant. <4 (L) 15 - 25 mg/dL   ESTIMATED GFR    Collection Time: 03/18/19 12:12 AM   Result Value Ref Range    GFR If African American >60 >60 mL/min/1.73 m 2    GFR If Non African American >60 >60 mL/min/1.73 m 2   URINALYSIS CULTURE, IF INDICATED    Collection Time: 03/18/19 12:35 AM   Result Value Ref Range    Color Yellow     Character Hazy (A)     Ph 6.0 5.0 - 8.0    Glucose Negative Negative mg/dL    Ketones Negative Negative mg/dL    Protein 30 (A) Negative mg/dL    Bilirubin Negative Negative    Nitrite Negative Negative    Leukocyte Esterase Negative Negative    Occult Blood Negative Negative    Micro Urine Req Microscopic    UR DRUG SCREEN([place] ONLY)    Collection Time: 03/18/19 12:35 AM   Result Value Ref Range    Phencyclidine -Pcp Negative Negative    Benzodiazepines Negative Negative    Cocaine  "Metabolite Negative Negative    Amphetamines By Triage Negative Negative    Urine THC Positive (A) Negative    Codeine-Morphine Positive (A) Negative    Barbiturates Negative Negative   REFRACTOMETER SG    Collection Time: 03/18/19 12:35 AM   Result Value Ref Range    Specific Gravity 1.030    URINE MICROSCOPIC (W/UA)    Collection Time: 03/18/19 12:35 AM   Result Value Ref Range    WBC 0-2 /hpf    RBC 0-2 /hpf    Bacteria Few (A) None /hpf    Epithelial Cells Moderate (A) Few /hpf    Mucous Threads Moderate /hpf    Urine Crystals Few Amorphous /hpf       ECG: pending    Cranial Imaging:none        ASSESSMENT: Pt is a 59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 Pt was admitted after overdosing on home medications as a SA. Pt has been depressed for the past 1 1/2. She started to have SI 1 week ago due to upcoming homeless status, and financial burden. She does not know if she regrets her SA. She continues to be hopeless and helpless. Unclear if she is still suicidal at this time (says \"I don't know, the circumstances haven't changed\". Pt is at elevated high risk of danger to self due to recent SA yesterday, declining socioeconomic status, no family or social support. She meets criteria for a legal hold at this time, and needs further higher level of psychiatric care in a psychiatric hospital for stabilization of mood and behavior.     Adjustment disorder with depressed mood  Gambling disorder      PLAN:  Legal status: extended  Psychotropic medications: taper Wellbutrin to 300mg daily and  Start Fluvoxamine 50mg daily for depressed mood and gambling.  Please transfer pt to inpatient psychiatric hospital when medically cleared and bed is available  Will follow  Thank you for the consult.  "

## 2019-03-18 NOTE — H&P
Hospital Medicine History & Physical Note    Date of Service  3/18/2019    Primary Care Physician  MARIA T Virgen    Consultants  none    Code Status  full    Chief Complaint  Suicide attempt     History of Presenting Illness  58 y.o. female who presented 3/17/2019 with past medical history of hypertension hyperlipidemia depression who presents with suicide attempt.  This patient ingested 10 tablets of Lopressor hydrochlorothiazide combination pill at 8:52 AM this morning.  She also ingested 60 mL of tussinex, she states she slept for about 12 hours and upon waking she developed acute onset nausea and vomiting.  Significant amount of lightheadedness and dizziness.  No syncopal episode.  She presented to the emergency department found to be bradycardic and hypotensive will be admitted to the icu for further management.     Review of Systems  Review of Systems   Constitutional: Positive for malaise/fatigue. Negative for chills and fever.   HENT: Negative for congestion, hearing loss and tinnitus.    Eyes: Negative for blurred vision, double vision and discharge.   Respiratory: Negative for cough, hemoptysis and shortness of breath.    Cardiovascular: Negative for chest pain, palpitations and leg swelling.   Gastrointestinal: Positive for nausea and vomiting. Negative for abdominal pain and heartburn.   Genitourinary: Negative for dysuria and flank pain.   Musculoskeletal: Negative for joint pain and myalgias.   Skin: Negative for rash.   Neurological: Positive for dizziness and weakness. Negative for sensory change, speech change and focal weakness.   Endo/Heme/Allergies: Negative for environmental allergies. Does not bruise/bleed easily.   Psychiatric/Behavioral: Positive for depression and suicidal ideas. Negative for hallucinations and substance abuse.       Past Medical History   has a past medical history of Hyperlipidemia and Hypertension. She also has no past medical history of Diabetes  (HCC).    Surgical History   has a past surgical history that includes abdominal hysterectomy total and tonsillectomy.     Family History  family history includes Cancer in her brother; Heart Attack in her mother; Heart Disease in her father and mother; Hypertension in her mother; Lung Disease in her father.     Social History   reports that she has been smoking Cigarettes.  She has smoked for the past 35.00 years. She has never used smokeless tobacco. She reports that she does not drink alcohol or use drugs.    Allergies  Allergies   Allergen Reactions   • Pcn [Penicillins]        Medications  Prior to Admission Medications   Prescriptions Last Dose Informant Patient Reported? Taking?   albuterol (PROAIR HFA) 108 (90 Base) MCG/ACT Aero Soln inhalation aerosol   No No   Sig: Inhale 2 Puffs by mouth every 6 hours as needed for Shortness of Breath.   buPROPion (WELLBUTRIN XL) 150 MG XL tablet   No No   Sig: Take 1 Tab by mouth every morning.   buPROPion (WELLBUTRIN XL) 300 MG XL tablet   No No   Sig: Take 1 Tab by mouth every morning.   metoprolol-hydrochlorothiazide (LOPRESSOR HCT) 50-25 MG per tablet   No No   Sig: Take 1 Tab by mouth every day.      Facility-Administered Medications: None       Physical Exam  Temp:  [36.1 °C (96.9 °F)] 36.1 °C (96.9 °F)  Pulse:  [47-72] 58  Resp:  [18-20] 18  BP: (125)/(69) 125/69  SpO2:  [82 %-99 %] 93 %    Physical Exam   Constitutional: She is oriented to person, place, and time. She appears well-developed and well-nourished. She appears distressed.   HENT:   Head: Normocephalic and atraumatic.   Dry oral mucosa   Eyes: Pupils are equal, round, and reactive to light. Conjunctivae and EOM are normal.   Neck: Normal range of motion. Neck supple. No JVD present.   Cardiovascular: Regular rhythm, normal heart sounds and intact distal pulses.    No murmur heard.  Bradycardia    Pulmonary/Chest: Effort normal and breath sounds normal. No respiratory distress. She has no wheezes.    Abdominal: Soft. Bowel sounds are normal. She exhibits no distension. There is no tenderness.   Musculoskeletal: Normal range of motion. She exhibits no edema.   Neurological: She is alert and oriented to person, place, and time. She exhibits normal muscle tone.   Skin: Skin is warm and dry.   Psychiatric:   Depressed   Nursing note and vitals reviewed.      Laboratory:  Recent Labs      03/18/19   0012   WBC  11.3*   RBC  4.81   HEMOGLOBIN  14.9   HEMATOCRIT  43.7   MCV  90.9   MCH  31.0   MCHC  34.1   RDW  44.7   PLATELETCT  366   MPV  10.3     Recent Labs      03/18/19   0012   SODIUM  136   POTASSIUM  3.1*   CHLORIDE  103   CO2  26   GLUCOSE  125*   BUN  11   CREATININE  0.83   CALCIUM  9.0     Recent Labs      03/18/19   0012   ALTSGPT  15   ASTSGOT  17   ALKPHOSPHAT  62   TBILIRUBIN  0.9   LIPASE  24   GLUCOSE  125*                 Recent Labs      03/18/19   0012   TROPONINI  <0.02       Urinalysis:    Recent Labs      03/18/19   0035   SPECGRAVITY  1.030   GLUCOSEUR  Negative   KETONES  Negative   NITRITE  Negative   LEUKESTERAS  Negative   WBCURINE  0-2   RBCURINE  0-2   BACTERIA  Few*   EPITHELCELL  Moderate*        Imaging:  DX-CHEST-PORTABLE (1 VIEW)   Final Result      Stable chest without acute/new abnormality.            Assessment/Plan:  I anticipate this patient will require at least two midnights for appropriate medical management, necessitating inpatient admission.    * Intentional overdose of beta-adrenergic blocking drug (HCC)   Assessment & Plan    Overdose on metoprolol/hctz 25/50 10 tablets at 9 am 3/17/2019   Cont cardiac monitoring   PRN glucagon as needed ordered for HR <50   Cont with IVF  IV calcium given   If no improvement may need glucagon ggt, or possible Insulin/dextrose   Mildly symptomatic at this time with dizziness, headache. No syncope alert and oriented.   Will monitor in ICU for now given poison control reccomendations   Case #[id number]         Hypotension   Assessment & Plan     Mildly symptomatic, patient alert and oriented   Due to medication overdose  Hold home BP medications   IVF for now   Monitor closely      Tobacco abuse   Assessment & Plan    Encouraged cessation      Depression   Assessment & Plan    Psych consultation   Resume wellbutrin for now     Hypokalemia   Assessment & Plan    kdur now and repeat labs in am      Suicide attempt (HCC)   Assessment & Plan    With overdose on metoprolol, tussinex  ip psych eval ordered         VTE prophylaxis: heparin

## 2019-03-18 NOTE — ED PROVIDER NOTES
ED Provider Note    ED Provider Note    Scribed for Darwin Dinero MD by Darwin Dinero. 3/17/2019, 11:53 PM.    Primary care provider: MARIA T Virgen  Means of arrival: Private  History obtained from: Patient  History limited by: None    CHIEF COMPLAINT  Chief Complaint   Patient presents with   • Suicidal Ideation     Patient reports attempting to overdose on rx medications. about 10 lopressor HTC, 60ml Tussinex, welbutrin x1   • N/V     states after taking the medications       HPI  This is a 58 y.o. female who presents to the Emergency Department after significant overdose.  Patient relates that she took 10 Lopressor/hydrochlorothiazide 25/50 mg tablets about 2 pills every few minutes starting at 8 AM this morning.  She relates associated lightheaded sensation and notes that she slept on her bed for quite some time.  She relates she awoke this evening, quite nauseous and experienced several severe episodes of emesis, now much improved at about 9 PM.  She relates a lightheaded sensation, unsure of a syncopal event given she was asleep on her bed throughout the day.  She also took her usual Wellbutrin tablet as well as 60 mL of Tussionex.  Patient relates no chest pain, no active abdominal pain, significant dry mouth and dizziness but mild nausea, no active vomiting.  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  She relates however she is does not follow with a psychiatrist and is never been admitted to an inpatient psych facility.    REVIEW OF SYSTEMS  Pertinent positives include fatigue, dizziness, nausea, vomiting, active suicidal ideation with significant attempt. Pertinent negatives include no head trauma, no alcohol or drug ingestion.  All other systems reviewed and negative.    PAST MEDICAL HISTORY   has a past medical history of Hyperlipidemia and Hypertension.    SURGICAL HISTORY   has a  "past surgical history that includes abdominal hysterectomy total and tonsillectomy.    SOCIAL HISTORY  Social History   Substance Use Topics   • Smoking status: Current Every Day Smoker     Years: 35.00     Types: Cigarettes   • Smokeless tobacco: Never Used   • Alcohol use No      History   Drug Use No       FAMILY HISTORY  Family History   Problem Relation Age of Onset   • Heart Disease Mother    • Hypertension Mother    • Heart Attack Mother    • Lung Disease Father    • Heart Disease Father    • Cancer Brother         colorectal       CURRENT MEDICATIONS  Home Medications     Reviewed by Sita Rivera R.N. (Registered Nurse) on 03/17/19 at 2335  Med List Status: Complete   Medication Last Dose Status   albuterol (PROAIR HFA) 108 (90 Base) MCG/ACT Aero Soln inhalation aerosol  Active   buPROPion (WELLBUTRIN XL) 150 MG XL tablet  Active   buPROPion (WELLBUTRIN XL) 300 MG XL tablet  Active   metoprolol-hydrochlorothiazide (LOPRESSOR HCT) 50-25 MG per tablet  Active                ALLERGIES  Allergies   Allergen Reactions   • Pcn [Penicillins]        PHYSICAL EXAM  VITAL SIGNS: /69   Pulse (!) 46 Comment: improvement w/ pressure fluids  Temp 36.1 °C (96.9 °F) (Temporal)   Resp 20 Comment: improvement w/ pressure fluids  Ht 1.651 m (5' 5\")   Wt 108.4 kg (238 lb 15.7 oz)   SpO2 100% Comment: improvement w/ pressure fluids  BMI 39.77 kg/m²     General: Alert, mild acute distress  Skin: Warm, dry, pale  Head: Normocephalic, atraumatic  Neck: Trachea midline, no tenderness  Eye: PERRL, normal conjunctiva  ENMT: Oral mucosa pink but very dry, no pharyngeal erythema or exudate  Cardiovascular: S1, S2, bradycardic, otherwise regular rate and rhythm, No murmur, Normal peripheral perfusion  Respiratory: Lungs CTA, respirations are non-labored, breath sounds are equal  Gastrointestinal: Soft, nontender, non distended  Musculoskeletal: No swelling, no deformity  Neurological: Alert and oriented to person, place, " time, and situation  Lymphatics: No lymphadenopathy  Psychiatric: Cooperative, depressed mood, flat affect.      DIAGNOSTIC STUDIES/PROCEDURES    LABS  Results for orders placed or performed during the hospital encounter of 03/17/19   CBC WITH DIFFERENTIAL   Result Value Ref Range    WBC 11.3 (H) 4.8 - 10.8 K/uL    RBC 4.81 4.20 - 5.40 M/uL    Hemoglobin 14.9 12.0 - 16.0 g/dL    Hematocrit 43.7 37.0 - 47.0 %    MCV 90.9 81.4 - 97.8 fL    MCH 31.0 27.0 - 33.0 pg    MCHC 34.1 33.6 - 35.0 g/dL    RDW 44.7 35.9 - 50.0 fL    Platelet Count 366 164 - 446 K/uL    MPV 10.3 9.0 - 12.9 fL    Neutrophils-Polys 71.60 44.00 - 72.00 %    Lymphocytes 21.40 (L) 22.00 - 41.00 %    Monocytes 5.20 0.00 - 13.40 %    Eosinophils 1.00 0.00 - 6.90 %    Basophils 0.50 0.00 - 1.80 %    Immature Granulocytes 0.30 0.00 - 0.90 %    Nucleated RBC 0.00 /100 WBC    Neutrophils (Absolute) 8.06 (H) 2.00 - 7.15 K/uL    Lymphs (Absolute) 2.41 1.00 - 4.80 K/uL    Monos (Absolute) 0.59 0.00 - 0.85 K/uL    Eos (Absolute) 0.11 0.00 - 0.51 K/uL    Baso (Absolute) 0.06 0.00 - 0.12 K/uL    Immature Granulocytes (abs) 0.03 0.00 - 0.11 K/uL    NRBC (Absolute) 0.00 K/uL   COMP METABOLIC PANEL   Result Value Ref Range    Sodium 136 135 - 145 mmol/L    Potassium 3.1 (L) 3.6 - 5.5 mmol/L    Chloride 103 96 - 112 mmol/L    Co2 26 20 - 33 mmol/L    Anion Gap 7.0 0.0 - 11.9    Glucose 125 (H) 65 - 99 mg/dL    Bun 11 8 - 22 mg/dL    Creatinine 0.83 0.50 - 1.40 mg/dL    Calcium 9.0 8.4 - 10.2 mg/dL    AST(SGOT) 17 12 - 45 U/L    ALT(SGPT) 15 2 - 50 U/L    Alkaline Phosphatase 62 30 - 99 U/L    Total Bilirubin 0.9 0.1 - 1.5 mg/dL    Albumin 3.9 3.2 - 4.9 g/dL    Total Protein 6.9 6.0 - 8.2 g/dL    Globulin 3.0 1.9 - 3.5 g/dL    A-G Ratio 1.3 g/dL   LIPASE   Result Value Ref Range    Lipase 24 7 - 58 U/L   TROPONIN   Result Value Ref Range    Troponin I <0.02 0.00 - 0.04 ng/mL   LACTIC ACID   Result Value Ref Range    Lactic Acid 0.8 0.5 - 2.0 mmol/L   URINALYSIS  CULTURE, IF INDICATED   Result Value Ref Range    Color Yellow     Character Hazy (A)     Ph 6.0 5.0 - 8.0    Glucose Negative Negative mg/dL    Ketones Negative Negative mg/dL    Protein 30 (A) Negative mg/dL    Bilirubin Negative Negative    Nitrite Negative Negative    Leukocyte Esterase Negative Negative    Occult Blood Negative Negative    Micro Urine Req Microscopic    DIAGNOSTIC ALCOHOL   Result Value Ref Range    Diagnostic Alcohol 0.01 (H) 0.00 g/dL   ACETAMINOPHEN   Result Value Ref Range    Acetaminophen -Tylenol <10 10 - 30 ug/mL   Salicylate   Result Value Ref Range    Salicylates, Quant. <4 (L) 15 - 25 mg/dL   UR DRUG SCREEN(SO MOREALND ONLY)   Result Value Ref Range    Phencyclidine -Pcp Negative Negative    Benzodiazepines Negative Negative    Cocaine Metabolite Negative Negative    Amphetamines By Triage Negative Negative    Urine THC Positive (A) Negative    Codeine-Morphine Positive (A) Negative    Barbiturates Negative Negative   REFRACTOMETER SG   Result Value Ref Range    Specific Gravity 1.030    URINE MICROSCOPIC (W/UA)   Result Value Ref Range    WBC 0-2 /hpf    RBC 0-2 /hpf    Bacteria Few (A) None /hpf    Epithelial Cells Moderate (A) Few /hpf    Mucous Threads Moderate /hpf    Urine Crystals Few Amorphous /hpf   ESTIMATED GFR   Result Value Ref Range    GFR If African American >60 >60 mL/min/1.73 m 2    GFR If Non African American >60 >60 mL/min/1.73 m 2     All labs reviewed by me, hypokalemia, otherwise unremarkable.    EKG  12 Lead EKG obtained at 0000 and interpreted by me to show:  Rhythm: Sinus bradycardia  Rate: 46  Axis: Normal  Intervals: Normal  Q Waves: Normal  No diagnostic ST segment elevation    Clinical Impression: Normal EKG  Compared to none available    RADIOLOGY  DX-CHEST-PORTABLE (1 VIEW)   Final Result      Stable chest without acute/new abnormality.        The radiologist's interpretation of all radiological studies have been reviewed by me.    COURSE & MEDICAL  DECISION MAKING  Pertinent Labs & Imaging studies reviewed. (See chart for details)    11:53 PM - Patient seen and examined at bedside. Patient will be treated with IV fluid resuscitation. Ordered toxicologic workup as well as EKG to evaluate her symptoms. The differential diagnoses include but are not limited to: Polysubstance overdose, drug induced bradycardia, hypovolemia, electrolyte abnormality    0009: Though initially patient was normotensive, repeat blood pressure checked with multiple cuffs is significantly hypotensive, given she is hypotensive and bradycardic in context of beta-blocker overdose I have ordered IV glucagon 5mg.  Thankfully there is no high-grade block, incomplete left bundle noted on EKG, metabolic workup is pending.  I have updated the patient's brother who brought her to the emergency department and she will need to be admitted.    0021: Patient is vomiting with glucagon administration, given she has no long QT I have ordered Zofran 4 mg IV.    0052: Vomiting resolved.  Patient remains bradycardic, heart rate currently is 45 sinus bradycardia on the monitor.  Blood pressures improved after glucagon, currently 104/62.  Awaiting metabolic workup at this time.    0110: Spoke with poison control who agree with plan for ICU admission.  The case number is [id number].    0135: Systolic blood pressure is again trending down, I have ordered repeat dose of glucagon, 3 mg IV at this time.  I spoke with the admitting hospitalist who covers the ICU and Dr. Jasmine, he concurs with plan and accepts the patient to his service.    HYDRATION: Based on the patient's presentation of Acute Vomiting and Dehydration the patient was given IV fluids. IV Hydration was used because oral hydration failed due to active emesis. Upon recheck following hydration, the patient was Feeling better, initial hypotension improved, 104/62 on recheck.    The total critical care time on this patient is 40 minutes, resuscitating  patient, speaking with admitting physician, and deciphering test results. This 60 minutes is exclusive of separately billable procedures.    Decision Making:  This is a 58 y.o. year old female who presents with dizziness as well as nausea and vomiting after overdose.  Patient took usual doses of Tussionex as well as Wellbutrin but admits intentional overdose of metoprolol tartrate/hydrochlorothiazide 25/50 (10 tablets).  Indeed the patient is hypotensive and bradycardic, with improvement with IV glucagon, consistent with beta-blocker overdose.  Given her initial hypotension and persistent bradycardia and given need for close observation ICU admission is warranted.  Given this is a significant life-threatening intentional overdose I have initiated legal hold, clearly the patient is not medically cleared at this time.    Patient is admitted to the care of the intensivist and improved but guarded condition to the ICU.    FINAL IMPRESSION  1. Suicide attempt by beta blocker overdose, initial encounter ([facility])    2. Dehydration    3. Non-intractable vomiting with nausea, unspecified vomiting type    4. Hypokalemia    5. Hypotension due to drugs    6. Bradycardia, sinus          Darwin FIORE (Rissa), am scribing for, and in the presence of, Darwin Dinero MD.    Electronically signed by: Darwin Dinero (Dennise), 3/17/2019    IDarwin MD personally performed the services described in this documentation, as scribed by Darwin Dinero in my presence, and it is both accurate and complete    The note accurately reflects work and decisions made by me.  Darwin Dinero  3/18/2019  1:21 AM

## 2019-03-18 NOTE — ED NOTES
Patient is in room 3.  Patient in on 1:1 observations by Ed Tech.  Assisted to the restroom where 1:1 was maintained and back to room.  Check list completed.

## 2019-03-18 NOTE — ED NOTES
At 0200 Hospitalist at bedside.  Aware that pt HR still in low 50's after administration of 3g glucagon.

## 2019-03-19 NOTE — DISCHARGE PLANNING
Notice of transfer filed with the court via [employer], scanned copy of verified notice onto .

## 2019-04-08 ENCOUNTER — OFFICE VISIT (OUTPATIENT)
Dept: URGENT CARE | Facility: CLINIC | Age: 59
End: 2019-04-08
Payer: COMMERCIAL

## 2019-04-08 VITALS
HEART RATE: 74 BPM | OXYGEN SATURATION: 95 % | TEMPERATURE: 97.5 F | SYSTOLIC BLOOD PRESSURE: 126 MMHG | HEIGHT: 65 IN | RESPIRATION RATE: 14 BRPM | WEIGHT: 238 LBS | DIASTOLIC BLOOD PRESSURE: 74 MMHG | BODY MASS INDEX: 39.65 KG/M2

## 2019-04-08 DIAGNOSIS — R68.89 FLU-LIKE SYMPTOMS: ICD-10-CM

## 2019-04-08 DIAGNOSIS — J06.9 VIRAL URI WITH COUGH: ICD-10-CM

## 2019-04-08 LAB
FLUAV+FLUBV AG SPEC QL IA: NEGATIVE
INT CON NEG: NEGATIVE
INT CON POS: POSITIVE

## 2019-04-08 PROCEDURE — 87804 INFLUENZA ASSAY W/OPTIC: CPT | Performed by: NURSE PRACTITIONER

## 2019-04-08 PROCEDURE — 99213 OFFICE O/P EST LOW 20 MIN: CPT | Performed by: NURSE PRACTITIONER

## 2019-04-08 RX ORDER — ACETAMINOPHEN 500 MG
500-1000 TABLET ORAL EVERY 6 HOURS PRN
Qty: 30 TAB | Refills: 0 | Status: SHIPPED | OUTPATIENT
Start: 2019-04-08 | End: 2019-05-08

## 2019-04-08 RX ORDER — FLUOXETINE HYDROCHLORIDE 20 MG/1
20 CAPSULE ORAL
Refills: 3 | COMMUNITY
Start: 2019-03-22 | End: 2019-09-17

## 2019-04-08 RX ORDER — METOPROLOL TARTRATE 50 MG/1
TABLET, FILM COATED ORAL
COMMUNITY
Start: 2019-03-22 | End: 2019-05-08

## 2019-04-08 ASSESSMENT — ENCOUNTER SYMPTOMS
DIARRHEA: 0
ORTHOPNEA: 0
EYE DISCHARGE: 0
SORE THROAT: 0
WHEEZING: 0
COUGH: 1
FEVER: 0
SHORTNESS OF BREATH: 0
SPUTUM PRODUCTION: 0
NAUSEA: 0
HEADACHES: 1
CHILLS: 0
MYALGIAS: 1

## 2019-04-08 NOTE — PROGRESS NOTES
Subjective:      Charlene Min is a 58 y.o. female who presents with Flu Like Symptoms            HPI new problem. 58 year old female with flu like symptoms for 4 days. She has cough, congestion, headache and body aches. Low grade fever intermittently of 100. +loose stools, no nausea or vomiting. Taking over the counter cough and cold. Brother in hospital with influenza A.  Pcn [penicillins]  Current Outpatient Prescriptions on File Prior to Visit   Medication Sig Dispense Refill   • metoprolol-hydrochlorothiazide (LOPRESSOR HCT) 50-25 MG per tablet Take 1 Tab by mouth every day. 90 Tab 3   • albuterol (PROAIR HFA) 108 (90 Base) MCG/ACT Aero Soln inhalation aerosol Inhale 2 Puffs by mouth every 6 hours as needed for Shortness of Breath. (Patient not taking: Reported on 4/8/2019) 8.5 g 3     No current facility-administered medications on file prior to visit.      Social History     Social History   • Marital status: Single     Spouse name: N/A   • Number of children: N/A   • Years of education: N/A     Occupational History   • Not on file.     Social History Main Topics   • Smoking status: Current Every Day Smoker     Years: 35.00     Types: Cigarettes   • Smokeless tobacco: Never Used   • Alcohol use No   • Drug use: No   • Sexual activity: Not on file     Other Topics Concern   • Not on file     Social History Narrative   • No narrative on file     family history includes Cancer in her brother; Heart Attack in her mother; Heart Disease in her father and mother; Hypertension in her mother; Lung Disease in her father.      Review of Systems   Constitutional: Positive for malaise/fatigue. Negative for chills and fever.   HENT: Positive for congestion. Negative for sore throat.    Eyes: Negative for discharge.   Respiratory: Positive for cough. Negative for sputum production, shortness of breath and wheezing.    Cardiovascular: Negative for chest pain and orthopnea.   Gastrointestinal: Negative for diarrhea  "and nausea.   Musculoskeletal: Positive for myalgias.   Neurological: Positive for headaches.   Endo/Heme/Allergies: Negative for environmental allergies.          Objective:     /74   Pulse 74   Temp 36.4 °C (97.5 °F) (Temporal)   Resp 14   Ht 1.651 m (5' 5\")   Wt 108 kg (238 lb)   SpO2 95%   BMI 39.61 kg/m²      Physical Exam   Constitutional: She is oriented to person, place, and time. She appears well-developed and well-nourished. No distress.   HENT:   Head: Normocephalic and atraumatic.   Right Ear: External ear and ear canal normal. Tympanic membrane is not injected and not perforated. No middle ear effusion.   Left Ear: External ear and ear canal normal. Tympanic membrane is not injected and not perforated.  No middle ear effusion.   Nose: Mucosal edema present.   Mouth/Throat: Posterior oropharyngeal erythema present. No oropharyngeal exudate.   Eyes: Conjunctivae are normal. Right eye exhibits no discharge. Left eye exhibits no discharge.   Neck: Normal range of motion. Neck supple.   Cardiovascular: Normal rate, regular rhythm and normal heart sounds.    No murmur heard.  Pulmonary/Chest: Effort normal and breath sounds normal. No respiratory distress. She has no wheezes. She has no rales.   Musculoskeletal: Normal range of motion.   Normal movement of all 4 extremities.   Lymphadenopathy:     She has no cervical adenopathy.        Right: No supraclavicular adenopathy present.        Left: No supraclavicular adenopathy present.   Neurological: She is alert and oriented to person, place, and time. Gait normal.   Skin: Skin is warm and dry.   Psychiatric: She has a normal mood and affect. Her behavior is normal. Thought content normal.   Nursing note and vitals reviewed.              Assessment/Plan:     1. Viral URI with cough  Vccurspax-[id number] (TINO-SELTZER PLUS COLD & FLU) 5-2- MG Effer Tab    acetaminophen (TYLENOL) 500 MG Tab   2. Flu-like symptoms  POCT Influenza A/B     Flu is " negative.  Viral illness at this time with no indication for antibiotics. Reviewed with patient expected course of illness and also reviewed OTC medications that may be used for symptom relief. Follow up 7-10 days if not improving.

## 2019-04-15 ENCOUNTER — OFFICE VISIT (OUTPATIENT)
Dept: URGENT CARE | Facility: CLINIC | Age: 59
End: 2019-04-15
Payer: COMMERCIAL

## 2019-04-15 VITALS
WEIGHT: 238 LBS | SYSTOLIC BLOOD PRESSURE: 130 MMHG | HEART RATE: 78 BPM | HEIGHT: 65 IN | OXYGEN SATURATION: 96 % | BODY MASS INDEX: 39.65 KG/M2 | DIASTOLIC BLOOD PRESSURE: 72 MMHG | TEMPERATURE: 98.1 F | RESPIRATION RATE: 18 BRPM

## 2019-04-15 DIAGNOSIS — J06.9 URI WITH COUGH AND CONGESTION: ICD-10-CM

## 2019-04-15 DIAGNOSIS — J40 BRONCHITIS: Primary | ICD-10-CM

## 2019-04-15 DIAGNOSIS — R19.7 DIARRHEA OF PRESUMED INFECTIOUS ORIGIN: ICD-10-CM

## 2019-04-15 PROCEDURE — 99214 OFFICE O/P EST MOD 30 MIN: CPT | Performed by: PHYSICIAN ASSISTANT

## 2019-04-15 RX ORDER — PROMETHAZINE HYDROCHLORIDE AND CODEINE PHOSPHATE 6.25; 1 MG/5ML; MG/5ML
5 SYRUP ORAL 4 TIMES DAILY PRN
Qty: 120 ML | Refills: 0 | Status: SHIPPED | OUTPATIENT
Start: 2019-04-15 | End: 2019-04-22

## 2019-04-15 RX ORDER — CIPROFLOXACIN 500 MG/1
500 TABLET, FILM COATED ORAL 2 TIMES DAILY
Qty: 14 TAB | Refills: 0 | Status: SHIPPED | OUTPATIENT
Start: 2019-04-15 | End: 2019-04-22

## 2019-04-15 RX ORDER — METHYLPREDNISOLONE 4 MG/1
TABLET ORAL
Qty: 21 TAB | Refills: 0 | Status: SHIPPED | OUTPATIENT
Start: 2019-04-15 | End: 2019-05-08

## 2019-04-15 NOTE — PROGRESS NOTES
"Subjective:      Pt is a 58 y.o. female who presents with Cough (couple days wet cough ) and Diarrhea (Few wks diarrhea)            HPI  This is a new problem. Pt notes 3-4 weeks of diarrhea without recent antibiotic use or foreign travel. Pt states she knows C.DIFF and states \"this is not C.DIFF\" and denies NV. PT also notes new cough x 3 days. Pt has not taken any Rx medications for this condition. Pt states the pain is a 4/10 with coughing, aching in nature and worse at night. Pt denies CP, SOB,  paresthesias, headaches, dizziness, change in vision, hives, or other joint pain. The pt's medication list, problem list, and allergies have been evaluated and reviewed during today's visit.    PMH:  Past Medical History:   Diagnosis Date   • Hyperlipidemia    • Hypertension        PSH:  Past Surgical History:   Procedure Laterality Date   • ABDOMINAL HYSTERECTOMY TOTAL     • TONSILLECTOMY         Fam Hx:    family history includes Cancer in her brother; Heart Attack in her mother; Heart Disease in her father and mother; Hypertension in her mother; Lung Disease in her father.  Family Status   Relation Status   • Mo    • Fa    • Bro Alive   • Bro Alive   • Bro Alive   • Bro        Soc HX:  Social History     Social History   • Marital status: Single     Spouse name: N/A   • Number of children: N/A   • Years of education: N/A     Occupational History   • Not on file.     Social History Main Topics   • Smoking status: Current Every Day Smoker     Years: 35.00     Types: Cigarettes   • Smokeless tobacco: Never Used   • Alcohol use No   • Drug use: No   • Sexual activity: Not on file     Other Topics Concern   • Not on file     Social History Narrative   • No narrative on file         Medications:    Current Outpatient Prescriptions:   •  ciprofloxacin (CIPRO) 500 MG Tab, Take 1 Tab by mouth 2 times a day for 7 days., Disp: 14 Tab, Rfl: 0  •  MethylPREDNISolone (MEDROL DOSEPAK) 4 MG Tablet Therapy " "Pack, Use as directed, Disp: 21 Tab, Rfl: 0  •  promethazine-codeine (PHENERGAN-CODEINE) 6.25-10 MG/5ML Syrup, Take 5 mL by mouth 4 times a day as needed for up to 7 days., Disp: 120 mL, Rfl: 0  •  FLUoxetine (PROZAC) 20 MG Cap, Take 20 mg by mouth., Disp: , Rfl: 3  •  metoprolol (LOPRESSOR) 50 MG Tab, , Disp: , Rfl:   •  acetaminophen (TYLENOL) 500 MG Tab, Take 1-2 Tabs by mouth every 6 hours as needed., Disp: 30 Tab, Rfl: 0  •  metoprolol-hydrochlorothiazide (LOPRESSOR HCT) 50-25 MG per tablet, Take 1 Tab by mouth every day., Disp: 90 Tab, Rfl: 3      Allergies:  Pcn [penicillins]    ROS  Review of Systems   Constitutional: Positive for malaise/fatigue. Negative for fever.   HENT: Positive for congestion and sore throat from postnasal drip with associated sinus pressure and fullness.    Eyes: Negative for blurred vision, double vision and photophobia.   Respiratory: Positive for cough and sputum production. Negative for hemoptysis, shortness of breath and wheezing.    Cardiovascular: Negative for chest pain and palpitations.   Gastrointestinal: NEG for nausea, vomiting, abdominal pain, POS diarrhea   Genitourinary: Negative for dysuria and flank pain.   Musculoskeletal: Negative for joint pain and myalgias.   Skin: Negative for itching and rash.   Neurological: Positive for headaches. Negative for dizziness and tingling.   Endo/Heme/Allergies: Does not bruise/bleed easily.   Psychiatric/Behavioral: Negative for depression. The patient is not nervous/anxious.           Objective:     /72 (BP Location: Right arm, Patient Position: Sitting, BP Cuff Size: Adult)   Pulse 78   Temp 36.7 °C (98.1 °F) (Temporal)   Resp 18   Ht 1.651 m (5' 5\")   Wt 108 kg (238 lb)   SpO2 96%   BMI 39.61 kg/m²      Physical Exam       Physical Exam   Constitutional: PT is oriented to person, place, and time. PT appears well-developed and well-nourished. No distress.   HENT:   Head: Normocephalic and atraumatic.   Right Ear: " Hearing, tympanic membrane, external ear and ear canal normal.   Left Ear: Hearing, tympanic membrane, external ear and ear canal normal.   Nose: Mucosal edema, rhinorrhea and sinus tenderness present. Right sinus exhibits frontal sinus tenderness. Left sinus exhibits frontal sinus tenderness.   Mouth/Throat: Uvula is midline. Mucous membranes are pale. Posterior oropharyngeal edema and posterior oropharyngeal erythema present. No oropharyngeal exudate.   Eyes: Conjunctivae normal and EOM are normal. Pupils are equal, round, and reactive to light. Right eye exhibits no discharge. Left eye exhibits no discharge.   Neck: Normal range of motion. Neck supple. No thyromegaly present.   Cardiovascular: Normal rate, regular rhythm, normal heart sounds and intact distal pulses.  Exam reveals no gallop and no friction rub.    No murmur heard.  Pulmonary/Chest: Effort normal. No respiratory distress. PT has wheezes. PT has no rales. PT exhibits tenderness.   Abdominal: Soft. Bowel sounds are normal. PT exhibits no distension and no mass. There is no tenderness. There is no rebound and no guarding.   Musculoskeletal: Normal range of motion. PT exhibits no edema and no tenderness.   Lymphadenopathy:     PT has no cervical adenopathy.   Neurological: Pt is alert and oriented to person, place, and time. Pt has normal reflexes. No cranial nerve deficit.   Skin: Skin is warm and dry. No rash noted. No erythema.   Psychiatric: PT has a normal mood and affect. Pt behavior is normal. Judgment and thought content normal.        Assessment/Plan:     1. Bronchitis    - ciprofloxacin (CIPRO) 500 MG Tab; Take 1 Tab by mouth 2 times a day for 7 days.  Dispense: 14 Tab; Refill: 0  - MethylPREDNISolone (MEDROL DOSEPAK) 4 MG Tablet Therapy Pack; Use as directed  Dispense: 21 Tab; Refill: 0    2. URI with cough and congestion    - MethylPREDNISolone (MEDROL DOSEPAK) 4 MG Tablet Therapy Pack; Use as directed  Dispense: 21 Tab; Refill: 0  -  promethazine-codeine (PHENERGAN-CODEINE) 6.25-10 MG/5ML Syrup; Take 5 mL by mouth 4 times a day as needed for up to 7 days.  Dispense: 120 mL; Refill: 0    3. Diarrhea of presumed infectious origin    - ciprofloxacin (CIPRO) 500 MG Tab; Take 1 Tab by mouth 2 times a day for 7 days.  Dispense: 14 Tab; Refill: 0  - MethylPREDNISolone (MEDROL DOSEPAK) 4 MG Tablet Therapy Pack; Use as directed  Dispense: 21 Tab; Refill: 0    Pt declines/defers stool cultures and CT abd pelvis  Rest, fluids encouraged.  OTC decongestant for congestion/cough  Note given for work.  AVS with medical info given.  Pt was in full understanding and agreement with the plan.  Differential diagnosis, natural history, supportive care, and indications for immediate follow-up discussed. All questions answered. Patient agrees with the plan of care.  Follow-up as needed if symptoms worsen or fail to improve.

## 2019-04-15 NOTE — LETTER
April 15, 2019       Patient: Charlene Min   YOB: 1960   Date of Visit: 4/15/2019         To Whom It May Concern:    It is my medical opinion that Charlene Min may be excused from work for the dates of 4/13/19-4/17/19 and may return to work on 4/18/19.    If you have any questions or concerns, please don't hesitate to call [phone]          Sincerely,          Ousmane Garcia P.A.-C.  Electronically Signed

## 2019-04-19 ENCOUNTER — TELEPHONE (OUTPATIENT)
Dept: PHYSICAL THERAPY | Facility: REHABILITATION | Age: 59
End: 2019-04-19

## 2019-04-19 NOTE — OP THERAPY DISCHARGE SUMMARY
Outpatient Physical Therapy  DISCHARGE SUMMARY NOTE      [facility] [address]  [address]  [place] [address]  Phone:  [phone]  Fax:  [phone]    Date of Visit: 04/19/2019    Patient: Charlene Min  YOB: 1960  MRN: [id number]     Referring Provider: MARIA T Virgen   Referring Diagnosis Numbness and tingling in left hand [R20.0, R20.2]r.     Physical Therapy Occurrence Codes    Date of onset of impairment:  1/23/19   Date physical therapy care plan established or reviewed:  2/21/19   Date physical therapy treatment started:  2/21/19          Functional Limitations and Severity Modifiers      Goal:     Discharge:         Your patient is being discharged from Physical Therapy with the following comments:   · Patient has failed to schedule or reschedule follow-up visits    Comments:  Pt has not scheduled f/u visits and it has now been >30 days since last appointment     Limitations Remaining:  Unable to reasses    Recommendations:  Pt to d/c. Pt welcome to return to PT with new referral from provider if needed.    Carla Macdonald, PT, DPT    Date: 4/19/2019

## 2019-04-30 ENCOUNTER — PATIENT MESSAGE (OUTPATIENT)
Dept: MEDICAL GROUP | Facility: MEDICAL CENTER | Age: 59
End: 2019-04-30

## 2019-04-30 RX ORDER — CIPROFLOXACIN 500 MG/1
500 TABLET, FILM COATED ORAL 2 TIMES DAILY
Qty: 14 TAB | Refills: 0 | Status: SHIPPED | OUTPATIENT
Start: 2019-04-30 | End: 2019-05-08

## 2019-04-30 RX ORDER — METRONIDAZOLE 500 MG/1
500 TABLET ORAL 3 TIMES DAILY
Qty: 21 TAB | Refills: 0 | Status: SHIPPED | OUTPATIENT
Start: 2019-04-30 | End: 2019-05-08

## 2019-04-30 NOTE — TELEPHONE ENCOUNTER
From: Charlene Min  To: MARIA T Virgen  Sent: 4/30/2019 3:00 AM PDT  Subject: Prescription Question    Adrian Escalona, Recently I visited urgent care with an upper respiratory infection and a diverticulitis flare up - bad day. The cipro and prednisone cleared it up, mostly. However I have lingering symptoms. Still a slight cough,wet and occasionally productive clear/white. And my LLQ is  with positional sharp pain.  I’ve scheduled an appointment with you (5/1/19) but wondered if starting another round of antibiotics would be prudent beforehand?    Charlene Min

## 2019-04-30 NOTE — TELEPHONE ENCOUNTER
From: BeverleyDesiree Min  To: MARIA T Virgen  Sent: 4/30/2019 11:55 AM PDT  Subject: Prescription Question    Actually, the appointment is on the 8th now. Had to reschedule for work. I defer to your judgement on the antibiotics - I’m functioning okay, just not fully over the symptoms.  ----- Message -----  From: MARIA T Virgen  Sent: 4/30/2019 9:33 AM PDT  To: Charlene Min  Subject: RE: Prescription Question  Adrian Chang,  I can renew a few additional days on Cipro and Flagyl to have you start until I see you on the first. Would you like me to send that in?  Lidia APARICIO      ----- Message -----   From: Charlene Min   Sent: 4/30/2019 3:00 AM PDT   To: MARIA T Virgen  Subject: Prescription Question    Hi Iqra, Recently I visited urgent care with an upper respiratory infection and a diverticulitis flare up - bad day. The cipro and prednisone cleared it up, mostly. However I have lingering symptoms. Still a slight cough,wet and occasionally productive clear/white. And my LLQ is  with positional sharp pain.  I’ve scheduled an appointment with you (5/1/19) but wondered if starting another round of antibiotics would be prudent beforehand?    Charlene Min

## 2019-05-01 ENCOUNTER — APPOINTMENT (OUTPATIENT)
Dept: MEDICAL GROUP | Facility: MEDICAL CENTER | Age: 59
End: 2019-05-01
Payer: COMMERCIAL

## 2019-05-08 ENCOUNTER — OFFICE VISIT (OUTPATIENT)
Dept: MEDICAL GROUP | Facility: MEDICAL CENTER | Age: 59
End: 2019-05-08
Payer: COMMERCIAL

## 2019-05-08 VITALS
BODY MASS INDEX: 39.15 KG/M2 | HEART RATE: 67 BPM | RESPIRATION RATE: 16 BRPM | DIASTOLIC BLOOD PRESSURE: 85 MMHG | HEIGHT: 65 IN | TEMPERATURE: 97.3 F | SYSTOLIC BLOOD PRESSURE: 124 MMHG | OXYGEN SATURATION: 95 % | WEIGHT: 235 LBS

## 2019-05-08 DIAGNOSIS — Z91.51 HISTORY OF SUICIDE ATTEMPT: ICD-10-CM

## 2019-05-08 DIAGNOSIS — Z23 NEED FOR PNEUMOCOCCAL VACCINATION: ICD-10-CM

## 2019-05-08 DIAGNOSIS — Z87.19 HISTORY OF DIVERTICULITIS: ICD-10-CM

## 2019-05-08 DIAGNOSIS — F32.9 MAJOR DEPRESSIVE DISORDER WITH CURRENT ACTIVE EPISODE, UNSPECIFIED DEPRESSION EPISODE SEVERITY, UNSPECIFIED WHETHER RECURRENT: ICD-10-CM

## 2019-05-08 DIAGNOSIS — Z59.9 HOUSING OR ECONOMIC CIRCUMSTANCES: ICD-10-CM

## 2019-05-08 PROBLEM — F32.A MILD EPISODE OF DEPRESSION: Status: RESOLVED | Noted: 2017-11-03 | Resolved: 2019-05-08

## 2019-05-08 PROCEDURE — 99214 OFFICE O/P EST MOD 30 MIN: CPT | Mod: 25 | Performed by: NURSE PRACTITIONER

## 2019-05-08 PROCEDURE — 90732 PPSV23 VACC 2 YRS+ SUBQ/IM: CPT | Performed by: NURSE PRACTITIONER

## 2019-05-08 PROCEDURE — 90471 IMMUNIZATION ADMIN: CPT | Performed by: NURSE PRACTITIONER

## 2019-05-08 SDOH — ECONOMIC STABILITY - INCOME SECURITY: PROBLEM RELATED TO HOUSING AND ECONOMIC CIRCUMSTANCES, UNSPECIFIED: Z59.9
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
## 2019-05-08 NOTE — ASSESSMENT & PLAN NOTE
Recent flare which is resolved at this point with cipro and flagyl. No abd pain, fever, chills. Having normal BM

## 2019-07-18 ENCOUNTER — PATIENT MESSAGE (OUTPATIENT)
Dept: MEDICAL GROUP | Facility: MEDICAL CENTER | Age: 59
End: 2019-07-18

## 2019-07-18 RX ORDER — BUPROPION HYDROCHLORIDE 150 MG/1
150 TABLET, EXTENDED RELEASE ORAL DAILY
Qty: 90 TAB | Refills: 3 | Status: SHIPPED | OUTPATIENT
Start: 2019-07-18 | End: 2019-09-17

## 2019-07-18 RX ORDER — FLUOXETINE HYDROCHLORIDE 20 MG/1
20 CAPSULE ORAL DAILY
Qty: 90 CAP | Refills: 3 | Status: SHIPPED | OUTPATIENT
Start: 2019-07-18 | End: 2019-09-17

## 2019-07-23 ENCOUNTER — OFFICE VISIT (OUTPATIENT)
Dept: URGENT CARE | Facility: CLINIC | Age: 59
End: 2019-07-23
Payer: COMMERCIAL

## 2019-07-23 VITALS
BODY MASS INDEX: 39.49 KG/M2 | SYSTOLIC BLOOD PRESSURE: 104 MMHG | TEMPERATURE: 97.7 F | HEIGHT: 65 IN | OXYGEN SATURATION: 95 % | DIASTOLIC BLOOD PRESSURE: 80 MMHG | WEIGHT: 237 LBS | HEART RATE: 50 BPM

## 2019-07-23 DIAGNOSIS — R10.32 LEFT LOWER QUADRANT PAIN: ICD-10-CM

## 2019-07-23 DIAGNOSIS — R10.32 LLQ ABDOMINAL PAIN: ICD-10-CM

## 2019-07-23 DIAGNOSIS — Z87.19 HISTORY OF DIVERTICULITIS: ICD-10-CM

## 2019-07-23 PROCEDURE — 99406 BEHAV CHNG SMOKING 3-10 MIN: CPT | Performed by: NURSE PRACTITIONER

## 2019-07-23 PROCEDURE — 99214 OFFICE O/P EST MOD 30 MIN: CPT | Mod: 25 | Performed by: NURSE PRACTITIONER

## 2019-07-23 RX ORDER — CIPROFLOXACIN 500 MG/1
500 TABLET, FILM COATED ORAL 2 TIMES DAILY
Qty: 14 TAB | Refills: 0 | Status: SHIPPED | OUTPATIENT
Start: 2019-07-23 | End: 2019-07-30

## 2019-07-23 RX ORDER — METRONIDAZOLE 500 MG/1
500 TABLET ORAL 3 TIMES DAILY
Qty: 21 TAB | Refills: 0 | Status: SHIPPED | OUTPATIENT
Start: 2019-07-23 | End: 2019-07-30

## 2019-07-23 ASSESSMENT — ENCOUNTER SYMPTOMS
ARTHRALGIAS: 0
WEIGHT LOSS: 0
HEMATOCHEZIA: 0
DIARRHEA: 0
ANOREXIA: 1
FLATUS: 0
VOMITING: 0
NAUSEA: 0
HEADACHES: 0
BELCHING: 0
CONSTIPATION: 0
FEVER: 0
MYALGIAS: 0

## 2019-07-23 NOTE — PROGRESS NOTES
"Subjective:      Charlene Min is a 59 y.o. female who presents with Abdominal Pain (Pt reprots LLQ pain. onset 9 hours ago Pt sts it is accute on chronic)        Reviewed past medical, surgical and family history. Reviewed prescription and OTC medications with patient in electronic health record today.     Allergies   Allergen Reactions   • Pcn [Penicillins]          LLQ Pain   This is a new problem. Episode onset:  approx 10 hours ago pain started and is steadily getting worse.  The onset quality is gradual. The problem occurs constantly. The problem has been rapidly worsening (Pain is very similar to her previous bouts with diverticulitis). The pain is located in the LLQ. The pain is at a severity of 5/10. The pain is moderate. The quality of the pain is colicky and aching. The abdominal pain does not radiate. Associated symptoms include anorexia. Pertinent negatives include no arthralgias, belching, constipation, diarrhea, dysuria, fever, flatus, frequency, headaches, hematochezia, hematuria, melena, myalgias, nausea, vomiting or weight loss. Nothing aggravates the pain. The pain is relieved by nothing. Treatments tried: clear liquid diet. The treatment provided mild relief.       Review of Systems   Constitutional: Negative for fever and weight loss.   Gastrointestinal: Positive for anorexia. Negative for constipation, diarrhea, flatus, hematochezia, melena, nausea and vomiting.   Genitourinary: Negative for dysuria, frequency and hematuria.   Musculoskeletal: Negative for arthralgias and myalgias.   Neurological: Negative for headaches.          Objective:     /80 (BP Location: Right arm, Patient Position: Sitting, BP Cuff Size: Large adult)   Pulse (!) 50   Temp 36.5 °C (97.7 °F) (Temporal)   Ht 1.651 m (5' 5\")   Wt 107.5 kg (237 lb)   SpO2 95%   BMI 39.44 kg/m²      Physical Exam   Constitutional: She is oriented to person, place, and time. Vital signs are normal. She appears " well-developed and well-nourished. She is cooperative.  Non-toxic appearance. She does not have a sickly appearance. She appears distressed.   HENT:   Head: Normocephalic.   Right Ear: Hearing normal.   Left Ear: Hearing normal.   Mouth/Throat: Uvula is midline and oropharynx is clear and moist.   Eyes: Pupils are equal, round, and reactive to light. Lids are normal.   Neck: Trachea normal, normal range of motion, full passive range of motion without pain and phonation normal.   Cardiovascular: Normal rate and regular rhythm.    Pulmonary/Chest: Effort normal and breath sounds normal.   Abdominal: Soft. Normal appearance and bowel sounds are normal. She exhibits no distension. There is tenderness in the left lower quadrant. There is no rigidity, no rebound, no guarding and no CVA tenderness.       Lymphadenopathy:     She has no cervical adenopathy.        Right: No supraclavicular adenopathy present.        Left: No supraclavicular adenopathy present.   Neurological: She is alert and oriented to person, place, and time.   Skin: Skin is warm and dry. No rash noted.   Psychiatric: She has a normal mood and affect. Her speech is normal and behavior is normal. Cognition and memory are normal.   Nursing note and vitals reviewed.              Assessment/Plan:     1. Left lower quadrant pain    - ciprofloxacin (CIPRO) 500 MG Tab; Take 1 Tab by mouth 2 times a day for 7 days.  Dispense: 14 Tab; Refill: 0  - metroNIDAZOLE (FLAGYL) 500 MG Tab; Take 1 Tab by mouth 3 times a day for 7 days.  Dispense: 21 Tab; Refill: 0    2. LLQ abdominal pain    - ciprofloxacin (CIPRO) 500 MG Tab; Take 1 Tab by mouth 2 times a day for 7 days.  Dispense: 14 Tab; Refill: 0  - metroNIDAZOLE (FLAGYL) 500 MG Tab; Take 1 Tab by mouth 3 times a day for 7 days.  Dispense: 21 Tab; Refill: 0    3. History of diverticulitis    - ciprofloxacin (CIPRO) 500 MG Tab; Take 1 Tab by mouth 2 times a day for 7 days.  Dispense: 14 Tab; Refill: 0  - metroNIDAZOLE  (FLAGYL) 500 MG Tab; Take 1 Tab by mouth 3 times a day for 7 days.  Dispense: 21 Tab; Refill: 0    Clear liquid diet until pain resolves. Then advance slowly as tolerated to low residual then high fiber diet  Keep well hydrated  Return to clinic or PCP prn  if current symptoms are not resolving in a satisfactory manner or sooner if new or worsening symptoms occur.   Patient was advised of signs and symptoms which would warrant further evaluation and /or emergent evaluation in ER.  Verbalized agreement with this treatment plan and seemed to understand without barriers. Questions were encouraged and answered to patients satisfaction.   Aftercare instructions were given to pt      > 3 minutes was spent in educating pt of  health risks  associated with tobacco use of any form. Educated on 1-800-QUIT-NOW smoking cessation line and [facility] tobacco cessation program for assistance and support.

## 2019-09-16 ENCOUNTER — PATIENT MESSAGE (OUTPATIENT)
Dept: MEDICAL GROUP | Facility: MEDICAL CENTER | Age: 59
End: 2019-09-16

## 2019-09-17 RX ORDER — FLUOXETINE 10 MG/1
10 CAPSULE ORAL DAILY
Qty: 30 CAP | Refills: 0 | Status: SHIPPED | OUTPATIENT
Start: 2019-09-17

## 2019-09-17 NOTE — PATIENT COMMUNICATION
Spoke with patient by phone, she is now living in [place].  She has already stopped her Wellbutrin and is doing well, she would like to wean down on Prozac.  10 mg prescription sent, instructions for weaning given.  She may contact me with any concerns

## 2019-11-26 ENCOUNTER — PATIENT MESSAGE (OUTPATIENT)
Dept: MEDICAL GROUP | Facility: MEDICAL CENTER | Age: 59
End: 2019-11-26

## 2019-11-26 NOTE — TELEPHONE ENCOUNTER
"From: Charlene Ramírez Pako  To: MARIA T Virgen  Sent: 11/26/2019 7:56 AM PST  Subject: Non-Urgent Medical Question    That’s correct. The reason the questionnaire became required is that I incorrectly answered that I was medically advised to leave work - I corrected my answer to “no” however, the questionnaire remained.   Though the move helped my stress, it was not presented as medically necessary by you.  Thank you for helping me.   Charlene  ----- Message -----  From: MARIA T Virgen  Sent: 11/26/2019 7:15 AM PST  To: Charlene Min  Subject: RE: Non-Urgent Medical Question  Adrian Chang. I looked over your paperwork. I am not sure that it will be helpful for you to have me fill this out. I will have to answer \"no\" to questions 5 through 8 meaning that I never medically advised you not to work. I can include dates that you were unable to work in question 9 due to the hospitalization in March but otherwise you do not have any medical restrictions. My understand was that you were moving out of state?    ----- Message -----   From: Charlene Min   Sent: 11/26/2019 1:42 AM PST   To: MARIA T Virgen  Subject: Non-Urgent Medical Question    Hi Iqra,   I have applied for unemployment while seeking work and the attached medical statement is required before December 2nd. It addresses my depression for which you treated.  Wishing you a happy Thanksgiving!  Charlene Min    "

## 2019-11-26 NOTE — TELEPHONE ENCOUNTER
From: Charlene Min  To: MARIA T Virgen  Sent: 11/26/2019 1:42 AM PST  Subject: Non-Urgent Medical Question    Adrian Escalona,   I have applied for unemployment while seeking work and the attached medical statement is required before December 2nd. It addresses my depression for which you treated.  Wishing you a happy Thanksgiving!  Charlene Min

## 2019-11-26 NOTE — TELEPHONE ENCOUNTER
"From: Charlene Min  To: MARIA T Virgen  Sent: 11/26/2019 8:01 AM PST  Subject: Non-Urgent Medical Question    Yes, please :)  ----- Message -----  From: MARIA T Virgen  Sent: 11/26/2019 7:59 AM PST  To: Charlene Min  Subject: RE: Non-Urgent Medical Question  So would you like me to go ahead and complete and fax in?    ----- Message -----   From: Charlene Min   Sent: 11/26/2019 7:56 AM PST   To: MARIA T Virgen  Subject: Non-Urgent Medical Question    That’s correct. The reason the questionnaire became required is that I incorrectly answered that I was medically advised to leave work - I corrected my answer to “no” however, the questionnaire remained.   Though the move helped my stress, it was not presented as medically necessary by you.  Thank you for helping me.   Charlene  ----- Message -----  From: MARIA T Virgen  Sent: 11/26/2019 7:15 AM PST  To: Charlene Min  Subject: RE: Non-Urgent Medical Question  Adrian Chang. I looked over your paperwork. I am not sure that it will be helpful for you to have me fill this out. I will have to answer \"no\" to questions 5 through 8 meaning that I never medically advised you not to work. I can include dates that you were unable to work in question 9 due to the hospitalization in March but otherwise you do not have any medical restrictions. My understand was that you were moving out of state?    ----- Message -----   From: Charlene Min   Sent: 11/26/2019 1:42 AM PST   To: MARIA T Virgen  Subject: Non-Urgent Medical Question    Adrian Escalona,   I have applied for unemployment while seeking work and the attached medical statement is required before December 2nd. It addresses my depression for which you treated.  Wishing you a happy Thanksgiving!  Charlene Min      "

## 2022-05-05 NOTE — ASSESSMENT & PLAN NOTE
Has had several flares  She notes improvement since quitting smoking  Was seen by GI   Pre-Visit Call   Since your last visit have you been hospitalized or treated in the emergency room or urgent care? NA  If yes: When, Where and Why? NA  Have you seen any other healthcare providers since your last visit with your Primary Care Provider? NA  If Yes: When and Who? NA  Tasks (Labs, Radiology, Medical Records)  Completed   Any Labs or Diagnostics since last visit? No    Quality Metrics:  Cervical Cancer Screen: No  Mammogram Screen: No  Colorectal Screen: No  Diabetes Eye Exam: No  Diabetes Hgb A1C: No  Diabetes Micro albumi: No    Types of Colorectal Screen  NA     Additional Comments: Left message. No labs. Last labs 3/11/22. Last wellness 9/30/21      Patient Reminders:  1. Bring Medication bottles with you to your appointment.   2. Take Blood pressure medicine at least one hour prior to appointment.

## 2022-12-02 ENCOUNTER — TELEPHONE (OUTPATIENT)
Dept: MEDICAL GROUP | Facility: MEDICAL CENTER | Age: 62
End: 2022-12-02